# Patient Record
Sex: FEMALE | Race: WHITE | Employment: OTHER | URBAN - METROPOLITAN AREA
[De-identification: names, ages, dates, MRNs, and addresses within clinical notes are randomized per-mention and may not be internally consistent; named-entity substitution may affect disease eponyms.]

---

## 2018-04-03 ENCOUNTER — HOSPITAL ENCOUNTER (OUTPATIENT)
Dept: WOMENS IMAGING | Age: 63
Discharge: HOME | End: 2018-04-03
Attending: FAMILY MEDICINE | Admitting: FAMILY MEDICINE

## 2018-04-03 DIAGNOSIS — Z12.31 VISIT FOR SCREENING MAMMOGRAM: ICD-10-CM

## 2018-08-27 ENCOUNTER — OFFICE VISIT (OUTPATIENT)
Dept: ORTHOPEDIC SURGERY | Age: 63
End: 2018-08-27

## 2018-08-27 VITALS
RESPIRATION RATE: 16 BRPM | DIASTOLIC BLOOD PRESSURE: 73 MMHG | WEIGHT: 270 LBS | HEIGHT: 64 IN | BODY MASS INDEX: 46.1 KG/M2 | SYSTOLIC BLOOD PRESSURE: 138 MMHG

## 2018-08-27 DIAGNOSIS — M65.30 TRIGGER FINGER, ACQUIRED: Primary | ICD-10-CM

## 2018-08-27 PROCEDURE — 99213 OFFICE O/P EST LOW 20 MIN: CPT | Performed by: ORTHOPAEDIC SURGERY

## 2018-08-27 PROCEDURE — 20550 NJX 1 TENDON SHEATH/LIGAMENT: CPT | Performed by: ORTHOPAEDIC SURGERY

## 2018-08-27 RX ORDER — VERAPAMIL HYDROCHLORIDE 240 MG/1
240 TABLET, FILM COATED, EXTENDED RELEASE ORAL
COMMUNITY
Start: 2018-05-29 | End: 2022-02-01 | Stop reason: ALTCHOICE

## 2018-08-27 RX ORDER — CITALOPRAM 40 MG/1
40 TABLET ORAL
COMMUNITY
Start: 2018-07-30 | End: 2018-08-27

## 2018-08-27 RX ORDER — CARVEDILOL 12.5 MG/1
TABLET ORAL 2 TIMES DAILY WITH MEALS
COMMUNITY
Start: 2018-08-20

## 2018-08-27 RX ORDER — OXYBUTYNIN CHLORIDE 10 MG/1
10 TABLET, EXTENDED RELEASE ORAL
COMMUNITY
End: 2019-04-30

## 2018-08-27 RX ORDER — LISINOPRIL 40 MG/1
20 TABLET ORAL DAILY
COMMUNITY
Start: 2018-05-29 | End: 2022-02-01 | Stop reason: ALTCHOICE

## 2018-08-27 NOTE — PROGRESS NOTES
I last examined this patient slightly less than 3 years ago at which time she was preop surgery of right ring trigger finger. She obtained prompt and complete relief of all symptoms. Unfortunately, the condition has recurred in her right ring finger, which has had 2 previous injections, and the patient returns to the office requesting additional treatment. She complains of pain, swelling, catching and stiffness of the digit for the past 1 week. Symptoms have become worse recently. The patient's social history, past medical history, family history, medications, allergies and review of systems have been reviewed, and dated and are recorded in the chart. On examination there is mild soft tissue swelling of the digit. There is no deformity. There is tenderness, thickening and nodularity at the base of the flexor tendon sheath. Range of motion is slightly limited in flexion and extension. The digit sticks in flexion and pops into extension, accompanied by some pain. Skin is intact without lesions. Distal circulation and sensation are intact. Muscle strength and coordination are normal.  Reflexes and present bilaterally. Joints are stable. There are no subcutaneous nodules or enlarged epitrochlear lymph nodes. Impression: Recurrent right ring finger trigger digit. The nature of this medical problem is fully discussed with the patient, including all treatment options. All questions are answered. The right  hand is prepared with Betadine and alcohol and the flexor tendon sheath of the right ring finger is injected with 1/2 milliliter of 1% lidocaine and 20 mg.of triamcinalone, with good filling. The patient is advised regarding the expected response and possible reactions from the injection. The patient is asked to call me if full, painless function has not returned within 4 weeks. The possibility of recurrence of the problem is discussed.  The patient understands that this is the 3rd and last steroid injection for this digit. If the problem recurs in this digit, they are asked to call me to discus surgical correction.

## 2019-04-01 ENCOUNTER — OFFICE VISIT (OUTPATIENT)
Dept: ORTHOPEDIC SURGERY | Age: 64
End: 2019-04-01
Payer: COMMERCIAL

## 2019-04-01 VITALS
SYSTOLIC BLOOD PRESSURE: 114 MMHG | BODY MASS INDEX: 46.1 KG/M2 | RESPIRATION RATE: 16 BRPM | HEIGHT: 64 IN | WEIGHT: 270 LBS | HEART RATE: 64 BPM | DIASTOLIC BLOOD PRESSURE: 67 MMHG

## 2019-04-01 DIAGNOSIS — M65.30 TRIGGER FINGER, ACQUIRED: Primary | ICD-10-CM

## 2019-04-01 PROCEDURE — 99243 OFF/OP CNSLTJ NEW/EST LOW 30: CPT | Performed by: ORTHOPAEDIC SURGERY

## 2019-04-01 RX ORDER — NAPROXEN 500 MG/1
500 TABLET ORAL
COMMUNITY
Start: 2019-03-21 | End: 2022-02-01 | Stop reason: ALTCHOICE

## 2019-04-01 NOTE — LETTER
[facility]  Surgery Scheduling Form:  DEMOGRAPHICS:                                                                                                              .    Patient Name:  Nikolay Dillon  Patient :  1955   Patient SS#:  [id number]    Patient Phone:  [phone] (home) [phone] (work)     Patient Address:  [place] [id number]    PCP:  Kerby Crigler, MD  Insurance:   Payor/Plan Subscr  Sex Relation Sub. Ins. ID Effective Group Num   1. [address] A 1955 Female  [id number]                                    [id number]   DIAGNOSIS & PROCEDURE:                                                                                            .  Diagnosis:   left Ring Finger Trigger Finger (727.03)   M65.30  Operation:  left Ring Finger Trigger Finger Release  [CPT: 61528]  Location:  [facility]  Surgeon:  Cecilio Feng    SCHEDULING INFORMATION:                                                                                         .  Surgeon's Scheduling Instruction:  elective  Requested Date:    OR Time:  8:30 Patient Arrival Time:  7:00OR Time Required:  10  Minutes  Anesthesia:  MAC/TIVA  Equipment:  None  Mini C-Arm:  No   Standard C-Arm:  No  Status:  outpatient  PAT Required:  Yes  Comments:         ALLERGIES:   [address] [facility] SWAPNIL Becerra MD  19     BILLING INFORMATION:                                                                                                    .    Procedure:       CPT Code Modifier

## 2019-04-01 NOTE — Clinical Note
Dear  Deng Acevedo MD,Thank you very much for your referral or Ms. Anshu Portillo to me for evaluation and treatment of her Hand & Wrist condition. I appreciate your confidence in me and thank you for allowing me the opportunity to care for your patients. If I can be of any further assistance to you on this or any other patient, please do not hesitate to contact me. Sincerely,Nitish Rosado MD

## 2019-04-01 NOTE — LETTER
CONSENT TO OPERATION  AND/OR OTHER PROCEDURE(S)          PATIENT : Samy Poole   YOB: 1955    DATE : 4/1/19          1. I request and consent that Dr. Nicole Duran,  and/or his associates or assistants perform an operation and/or procedures on the above patient at  [place] [address], to treat the condition(s) which appear indicated by the diagnostic studies already performed. I have been told that in general terms the nature, purpose and reasonable expectations of the operation and/or procedure(s) are:       left Ring Finger trigger finger release       2. It has been explained to me by the informing physician that during the course of the operation and/or procedure(s) unforeseen conditions may be revealed that necessitate an extension of the original operation and/or procedure(s) or different operation and/or procedures than those set forth in Paragraph 1. I therefore authorize and request that my physician and/or his associates or assistants perform such operations and/or procedures as are necessary and desirable in the exercise of professional judgment. The authority granted under this Paragraph 2 shall extend to all conditions that require treatment and are known to my physician at the time the operation is commenced. 3. I have been made aware by the informing physician of certain risks and consequences that are associated with the operation and/or procedure(s) described in Paragraph 1, the reasonable alternative methods or treatment, the possible consequences, the possibility that the operation and/or procedure(s) may be unsuccessful and the possibility of complications. I understand the reasonably known risks to be:      ? Bleeding  ? Infection  ? Poor Healing  ? Possible Damage to Nerve, Vessel, Tendon/Muscle or Bone  ? Need for further Treatment/Surgery  ? Stiffness  ? Pain  ? Residual or Recurrent Symptoms  ? Anesthetic and/or Medical Risks  ? 4. I have also been informed by the informing physician that there are other risks from both known and unknown causes that are attendant to the performance of any surgical procedure. I am aware that the practice of medicine and surgery is not an exact science, and that no guarantees have been made to me concerning the results of the operation and/or procedure(s). 5. I   CONSENT / REFUSE CONSENT  (strike the phrase that does not apply) to the taking of photographs before, during and/or after the operation or procedure for scientific/educational purposes. 6. I consent to the administration of anesthesia and to the use of such anesthetics as may be deemed advisable by the anesthesiologist who has been engaged by me or my physician. 7. I certify that I have read and understand the above consent to operation and/or other procedure(s); that the explanations therein referred to were made to me by the informing physician in advance of my signing this consent; that all blanks or statements requiring insertion or completion were filled in and inapplicable paragraphs, if any, were stricken before I signed; and that all questions asked by me about the operation and/or procedure(s) which I have consented to have been fully answered in a satisfactory manner.             _______________________  Witness        Signature Of Patient          Kunal Caruso. Mary Pickering      _______________________  Informing Physician         Signature of Informing Physician           If patient is unable to sign or is a minor, complete one of the following:    (A)  Patient is a minor  years of age. (B)  Patient is unable to sign because: The undersigned represents that he or she is duly authorized to execute this consent for and on behalf of the above named patient.               Witness               o  Parent  o  Guardian   o  Spouse       o  Other (specify)

## 2019-04-01 NOTE — PROGRESS NOTES
Ms. Mariana Espinoza returns today in follow-up of her previously treated  left  Ring Finger Trigger Finger. She was last seen in August, 2018 by  Dr. Zofia Schneider  at which time she was treated with Steroid Injection. She experienced complete relief of her initial symptoms. She  has noticed symptom recurrence over the last several months. She returns today with Worsened symptoms of left Ring Finger Stenosing Tenosynovitis, requesting further treatment. The patient's , past medical history, medications, allergies,  family history, social history, and review of systems have been reviewed and are recorded in the chart. Physical Exam:  Vitals  BP: 114/67  Pulse: 64  Resp: 16  Height: 5' 4\" (162.6 cm)  Weight: 270 lb (122.5 kg)  Ms. Mariana Espinoza appears well, she is in no apparent distress, she demonstrates appropriate mood & affect. Skin: Skin color, texture, turgor normal. No rashes or lesions bilaterally  Digital range of motion is full bilaterally. There is mild triggering at the A1 pulley of the symptomatic left Ring Finger. Wrist range of motion is full and equal bilateral otherwise normal bilaterally  There is no evidence of gross joint instability bilaterally. Sensation is subjectively normal bilaterally  Vascular examination reveals normal and good capillary refill bilaterally  Swelling is mild in the symptomatic digit(s) on the Left, normal on the Right  Muscular strength is clinically appropriate bilaterally. Examination for Stenosing Tenosynovitis demonstrates moderate tenderness, thickening & nodularity at the A-1 pulley(s) of the Left Ring Finger. There is a palpable Nota's Node. There is triggering on active flexion with pain. No other digits demonstrate evidence of Stenosing Tenosynovitis. Examination of the first dorsal extensor compartments of the wrist demonstrates no thickening or fullness. There is no tenderness to palpation over the extensor tendons.   Pain is not elicited with resisted thumb extension, and Finklestein's maneuver is negative bilaterally. Impression:  Ms. Clara Santiago is showing evidence of recurrent Stenosing Tenosynovitis (Trigger Finger) after previous treatment. She requests additional treatment at this time. Plan:    After discussion of the treatment options available for the stenosing tenosynovitis and review of her past treatments, I have outline for Ms. Ernesto Poole the surgical treatment options for trigger finger. We have discussed the details of the surgical procedure and the pre, alexander and postoperative concerns and appropriate expectations after this surgical procedure. She was given opportunity to ask questions and had them answered fully to her satisfaction. She voiced an understanding of the procedure and did wish to proceed with left Ring Finger Flexor Tendon Sheath A1 Pulley Release (Trigger Finger Release). I had an extensive discussion with Ms. Clara Santiago and any family members present regarding the natural history, etiology, and long term consequences of this problem. I have outlined a treatment plan with them and, in my opinion, surgical intervention is indicated at this time. I have discussed with them the potential complications, limitations, expectations, alternatives, and risks of left Ring Finger Flexor Tendon Sheath A1 Pulley Release (Trigger Finger Release). She has had full opportunity to ask her questions. I have answered them all to her satisfaction. I feel that Ms. Clara Santiago  and any present family members do understand our discussion today and they have provided informed consent for left Ring Finger Flexor Tendon Sheath A1 Pulley Release (Trigger Finger Release). I have also discussed with Ms. Clara Santiago the other treatment options available to her for this condition. We have today selected to proceed with Surgical treatment.   She has voiced and  understanding that there are other less aggressive treatment options which are available in this situation, albeit possibly less efficacious or durable, and she is comfortable with the plan that she has chosen. Ms. Karlos Arce has been given a full verbal list of instructions and precautions related to her present condition. I have asked her to followup with me in the office at the prescribed time. She is also specifically requested to call or return to the office sooner if her symptoms change or worsen prior to the next scheduled appointment.

## 2019-04-26 ENCOUNTER — ANESTHESIA EVENT (OUTPATIENT)
Dept: OPERATING ROOM | Age: 64
End: 2019-04-26
Payer: COMMERCIAL

## 2019-04-30 RX ORDER — ALBUTEROL SULFATE 90 UG/1
1 AEROSOL, METERED RESPIRATORY (INHALATION) EVERY 6 HOURS PRN
COMMUNITY
Start: 2017-11-17 | End: 2022-02-01 | Stop reason: ALTCHOICE

## 2019-04-30 RX ORDER — ALBUTEROL SULFATE 2.5 MG/3ML
2.5 SOLUTION RESPIRATORY (INHALATION) PRN
COMMUNITY
Start: 2018-09-18 | End: 2022-02-01 | Stop reason: ALTCHOICE

## 2019-04-30 RX ORDER — NICOTINE POLACRILEX 4 MG/1
20 GUM, CHEWING ORAL DAILY
COMMUNITY
Start: 2015-05-18

## 2019-04-30 RX ORDER — ACETAMINOPHEN 500 MG
500 TABLET ORAL EVERY 6 HOURS PRN
COMMUNITY
End: 2022-02-01 | Stop reason: ALTCHOICE

## 2019-04-30 NOTE — PROGRESS NOTES
C-Difficile admission screening and protocol:     * Admitted with diarrhea?no     *Prior history of C-Diff. In last 3 months?no     *Antibiotic use in the past 6-8 weeks?no     *Prior hospitalization or nursing home in the last month?   no       [address] time___0700 per pt_________        Surgery time____0820________    Take the following medications with a sip of water:lisinopril,coreg and verapamil    Do not eat or drink anything after 12:00 midnight prior to your surgery. This includes water chewing gum, mints and ice chips. You may brush your teeth and gargle the morning of your surgery, but do not swallow the water     Please see your family doctor/pediatrician for a history and physical and/or concerning medications. Bring any test results/reports from your physicians office. If you are under the care of a heart doctor or specialist doctor, please be aware that you may be asked to them for clearance    You may be asked to stop blood thinners such as Coumadin, Plavix, Fragmin, Lovenox, etc., or any anti-inflammatories such as:  Aspirin, Ibuprofen, Advil, Naproxen prior to your surgery. We also ask that you stop any OTC medications such as fish oil, vitamin E, glucosamine, garlic, Multivitamins, COQ 10, etc.    We ask that you do not smoke 24 hours prior to surgery  We ask that you do not  drink any alcoholic beverages 24 hours prior to surgery     You must make arrangements for a responsible adult to take you home after your surgery. For your safety you will not be allowed to leave alone or drive yourself home. Your surgery will be cancelled if you do not have a ride home. Also for your safety, it is strongly suggested that someone stay with you the first 24 hours after your surgery. A parent or legal guardian must accompany a child scheduled for surgery and plan to stay at the hospital until the child is discharged.     Please do not bring other children with you. For your comfort, please wear simple loose fitting clothing to the hospital.  Please do not bring valuables. Do not wear any make-up or nail polish on your fingers or toes      For your safety, please do not wear any jewelry or body piercing's on the day of surgery. All jewelry must be removed. If you have dentures, they will be removed before going to operating room. For your convenience, we will provide you with a container. If you wear contact lenses or glasses, they will be removed, please bring a case for them. If you have a living will and a durable power of  for healthcare, please bring in a copy. As part of our patient safety program to minimize surgical site infections, we ask you to do the following:    · Please notify your surgeon if you develop any illness between         now and the  day of your surgery. · This includes a cough, cold, fever, sore throat, nausea,         or vomiting, and diarrhea, etc.  ·  Please notify your surgeon if you experience dizziness, shortness         of breath or blurred vision between now and the time of your surgery. Do not shave your operative site 96 hours prior to surgery. For face and neck surgery, men may use an electric razor 48 hours   prior to surgery. You may shower the night before surgery or the morning of   your surgery with an antibacterial soap. You will need to bring a photo ID and insurance card    [facility] has an onsite pharmacy, would you like to utilize our pharmacy     If you will be staying overnight and use a C-pap machine, please bring   your C-pap to hospital     Our goal is to provide you with excellent care, therefore, visitors will be limited to two(2) in the room at a time so that we may focus on providing this care for you. Please contact pre-admission testing if you have any further questions.                  [facility] phone number:  [phone] [employer]

## 2019-05-02 ENCOUNTER — HOSPITAL ENCOUNTER (OUTPATIENT)
Age: 64
Setting detail: OUTPATIENT SURGERY
Discharge: HOME | End: 2019-05-02
Attending: ORTHOPAEDIC SURGERY | Admitting: ORTHOPAEDIC SURGERY
Payer: COMMERCIAL

## 2019-05-02 ENCOUNTER — ANESTHESIA (OUTPATIENT)
Dept: OPERATING ROOM | Age: 64
End: 2019-05-02
Payer: COMMERCIAL

## 2019-05-02 VITALS
DIASTOLIC BLOOD PRESSURE: 60 MMHG | BODY MASS INDEX: 46.21 KG/M2 | OXYGEN SATURATION: 98 % | HEART RATE: 53 BPM | HEIGHT: 65 IN | TEMPERATURE: 97 F | RESPIRATION RATE: 18 BRPM | WEIGHT: 277.34 LBS | SYSTOLIC BLOOD PRESSURE: 108 MMHG

## 2019-05-02 VITALS — OXYGEN SATURATION: 100 % | SYSTOLIC BLOOD PRESSURE: 109 MMHG | DIASTOLIC BLOOD PRESSURE: 54 MMHG

## 2019-05-02 PROCEDURE — 3700000000 HC ANESTHESIA ATTENDED CARE: Performed by: ORTHOPAEDIC SURGERY

## 2019-05-02 PROCEDURE — 7100000011 HC PHASE II RECOVERY - ADDTL 15 MIN: Performed by: ORTHOPAEDIC SURGERY

## 2019-05-02 PROCEDURE — 2500000003 HC RX 250 WO HCPCS: Performed by: ORTHOPAEDIC SURGERY

## 2019-05-02 PROCEDURE — 7100000000 HC PACU RECOVERY - FIRST 15 MIN: Performed by: ORTHOPAEDIC SURGERY

## 2019-05-02 PROCEDURE — 6360000002 HC RX W HCPCS: Performed by: NURSE ANESTHETIST, CERTIFIED REGISTERED

## 2019-05-02 PROCEDURE — 2580000003 HC RX 258: Performed by: ANESTHESIOLOGY

## 2019-05-02 PROCEDURE — 3600000005 HC SURGERY LEVEL 5 BASE: Performed by: ORTHOPAEDIC SURGERY

## 2019-05-02 PROCEDURE — 2500000003 HC RX 250 WO HCPCS: Performed by: NURSE ANESTHETIST, CERTIFIED REGISTERED

## 2019-05-02 PROCEDURE — 2709999900 HC NON-CHARGEABLE SUPPLY: Performed by: ORTHOPAEDIC SURGERY

## 2019-05-02 PROCEDURE — 7100000010 HC PHASE II RECOVERY - FIRST 15 MIN: Performed by: ORTHOPAEDIC SURGERY

## 2019-05-02 PROCEDURE — 7100000001 HC PACU RECOVERY - ADDTL 15 MIN: Performed by: ORTHOPAEDIC SURGERY

## 2019-05-02 RX ORDER — SODIUM CHLORIDE 0.9 % (FLUSH) 0.9 %
10 SYRINGE (ML) INJECTION PRN
Status: DISCONTINUED | OUTPATIENT
Start: 2019-05-02 | End: 2019-05-02 | Stop reason: HOSPADM

## 2019-05-02 RX ORDER — SODIUM CHLORIDE 9 MG/ML
INJECTION, SOLUTION INTRAVENOUS CONTINUOUS
Status: DISCONTINUED | OUTPATIENT
Start: 2019-05-02 | End: 2019-05-02 | Stop reason: HOSPADM

## 2019-05-02 RX ORDER — FENTANYL CITRATE 50 UG/ML
25 INJECTION, SOLUTION INTRAMUSCULAR; INTRAVENOUS EVERY 5 MIN PRN
Status: DISCONTINUED | OUTPATIENT
Start: 2019-05-02 | End: 2019-05-02 | Stop reason: HOSPADM

## 2019-05-02 RX ORDER — OXYCODONE HYDROCHLORIDE AND ACETAMINOPHEN 5; 325 MG/1; MG/1
1 TABLET ORAL PRN
Status: DISCONTINUED | OUTPATIENT
Start: 2019-05-02 | End: 2019-05-02 | Stop reason: HOSPADM

## 2019-05-02 RX ORDER — ONDANSETRON 2 MG/ML
4 INJECTION INTRAMUSCULAR; INTRAVENOUS
Status: DISCONTINUED | OUTPATIENT
Start: 2019-05-02 | End: 2019-05-02 | Stop reason: HOSPADM

## 2019-05-02 RX ORDER — PROPOFOL 10 MG/ML
INJECTION, EMULSION INTRAVENOUS CONTINUOUS PRN
Status: DISCONTINUED | OUTPATIENT
Start: 2019-05-02 | End: 2019-05-02 | Stop reason: SDUPTHER

## 2019-05-02 RX ORDER — OXYCODONE HYDROCHLORIDE AND ACETAMINOPHEN 5; 325 MG/1; MG/1
2 TABLET ORAL PRN
Status: DISCONTINUED | OUTPATIENT
Start: 2019-05-02 | End: 2019-05-02 | Stop reason: HOSPADM

## 2019-05-02 RX ORDER — PROPOFOL 10 MG/ML
INJECTION, EMULSION INTRAVENOUS PRN
Status: DISCONTINUED | OUTPATIENT
Start: 2019-05-02 | End: 2019-05-02 | Stop reason: SDUPTHER

## 2019-05-02 RX ORDER — LIDOCAINE HYDROCHLORIDE 20 MG/ML
INJECTION, SOLUTION EPIDURAL; INFILTRATION; INTRACAUDAL; PERINEURAL PRN
Status: DISCONTINUED | OUTPATIENT
Start: 2019-05-02 | End: 2019-05-02 | Stop reason: SDUPTHER

## 2019-05-02 RX ORDER — SODIUM CHLORIDE 0.9 % (FLUSH) 0.9 %
10 SYRINGE (ML) INJECTION EVERY 12 HOURS SCHEDULED
Status: DISCONTINUED | OUTPATIENT
Start: 2019-05-02 | End: 2019-05-02 | Stop reason: HOSPADM

## 2019-05-02 RX ADMIN — PROPOFOL 160 MCG/KG/MIN: 10 INJECTION, EMULSION INTRAVENOUS at 08:11

## 2019-05-02 RX ADMIN — SODIUM CHLORIDE: 9 INJECTION, SOLUTION INTRAVENOUS at 07:38

## 2019-05-02 RX ADMIN — LIDOCAINE HYDROCHLORIDE 50 MG: 20 INJECTION, SOLUTION EPIDURAL; INFILTRATION; INTRACAUDAL; PERINEURAL at 08:11

## 2019-05-02 RX ADMIN — PROPOFOL 100 MG: 10 INJECTION, EMULSION INTRAVENOUS at 08:11

## 2019-05-02 ASSESSMENT — PULMONARY FUNCTION TESTS
PIF_VALUE: 0

## 2019-05-02 ASSESSMENT — PAIN SCALES - GENERAL
PAINLEVEL_OUTOF10: 0

## 2019-05-02 ASSESSMENT — PAIN DESCRIPTION - DESCRIPTORS: DESCRIPTORS: THROBBING

## 2019-05-02 ASSESSMENT — ENCOUNTER SYMPTOMS: SHORTNESS OF BREATH: 0

## 2019-05-02 ASSESSMENT — PAIN - FUNCTIONAL ASSESSMENT
PAIN_FUNCTIONAL_ASSESSMENT: 0-10
PAIN_FUNCTIONAL_ASSESSMENT: ACTIVITIES ARE NOT PREVENTED

## 2019-05-02 NOTE — ANESTHESIA POSTPROCEDURE EVALUATION
Department of Anesthesiology  Postprocedure Note    Patient: Lincoln Montana  MRN: [id number]  YOB: 1955  Date of evaluation: 5/2/2019  Time:  9:55 AM     Procedure Summary     Date:  05/02/19 Room / Location:  [facility] OR 03 / [facility] OR    Anesthesia Start:  0810 Anesthesia Stop:  0825    Procedure:  LEFT RING FINGER TRIGGER FINGER RELEASE (Left ) Diagnosis:  (LEFT RING FINGER TRIGGER FINGER)    Surgeon:  Jose Elias Stroud MD Responsible Provider:  Nubia Gregorio MD    Anesthesia Type:  TIVA ASA Status:  3          Anesthesia Type: TIVA    Jamie Phase I: Jamie Score: 10    Jamie Phase II: Jamie Score: 10    Last vitals: Reviewed and per EMR flowsheets.        Anesthesia Post Evaluation    Patient location during evaluation: PACU  Patient participation: complete - patient participated  Level of consciousness: awake and alert  Airway patency: patent  Nausea & Vomiting: no nausea and no vomiting  Complications: no  Cardiovascular status: hemodynamically stable  Respiratory status: acceptable  Hydration status: stable

## 2019-05-02 NOTE — PROGRESS NOTES
0900 pt alert and oriented, vital signs stable, no pain in left hand, pt able to move all of her fingers well. Taking PO snacks with no issues.

## 2019-05-02 NOTE — H&P
Pre-operative Update of H&P:    I  have seen & examined Ms. Saran Poole related solely to her hand and upper extremity conditions, prior to the scheduled procedure on the date of her surgery. The indications for the planned surgical procedure & and her upper-extremity conditionare unchanged. Please see the Anesthesia Pre-Op Note from date of surgery for Ms. Nate Poole's systemic evaluation.

## 2019-05-02 NOTE — PROGRESS NOTES
Patient arouses to name. Resp easy unlabored on room air O2 with SAO2 97%. Patient denies C/O pain or nausea. Moving all extremities to command. HOB up. VSS.

## 2019-05-02 NOTE — OP NOTE
OPERATIVE REPORT              . Patient:  Cristiane Yeager    YOB: 1955  Date of Service:  5/2/2019  Location:  [facility]        Preoperative Diagnosis:  Left Ring Finger trigger finger. Postoperative Diagnosis: Same. Procedure: Left Ring Finger trigger finger release (A1 pulley release). Surgeon:    Leigh Ann Bose. Candice Gonzalez MD    Surgical Assistant:    SHYAM Pereira Assistant    Anesthesia:  Local with sedation. Blood Loss:  Minimal.     Complications:  None. Tourniquet Time:  2 minutes. Indications:  Ms. Cristiane Yeager  is a 61y.o.  year old female with a Left Ring Finger trigger finger. I have discussed preoperatively with her the complications, limitations, expectations, alternatives and risks of the planned surgical care. She has voiced an understanding of our discussion. All of her questions have been fully answered to her satisfaction, and she has provided written informed consent to proceed. Procedure:  After written consent was obtained and the proper operative site was identified and marked, Ms. Cristiane Yeager  was brought to the operating room, placed in the supine position on the operating room table with the Left arm extended upon a hand table. Under an appropriate level of sedation, local anesthetic (1% Lidocaine and 1/2% Marcaine both without Epinephrine) was instilled in the planned surgical field. Her Left upper extremity was prepped and draped in the usual sterile fashion. After Eshmarch exsanguination the pneumo-tourniquet was inflated to 250 milimeters of mercury. A 1 centimeter oblique incision was fashioned over the base of the flexor tendon sheath of the Left Ring Finger. Dissection was carried carefully through the subcutaneous tissues, taking great care to identify and protect the neurovascular structures. The flexor tendon sheath was carefully identified and cleared of surrounding soft tissue.  The A1 pulley was

## 2019-05-02 NOTE — PROGRESS NOTES
Pt's vital signs stable in chair, discharge instructions given with friend at bedside. No further questions at this time.

## 2019-05-02 NOTE — PROGRESS NOTES
Patient admitted to PACU from OR. Patient asleep. Resp easy unlabored on 2L NC with SAO2 99%. Monitor in SB. Left hand dressing dry and intact, no drainage noted. Left hand/arm elevated on pillow. VSS. IV patent to right hand.

## 2019-05-02 NOTE — ANESTHESIA PRE PROCEDURE
Body mass index is 46.15 kg/m². CBC:   Lab Results   Component Value Date    WBC 7.7 11/12/2012    RBC 4.56 11/12/2012    HGB 13.9 11/12/2012    HCT 40.3 11/12/2012    MCV 88.4 11/12/2012    RDW 13.8 11/12/2012     11/12/2012       CMP:   Lab Results   Component Value Date     10/29/2015    K 5.1 10/29/2015     10/29/2015    CO2 24 10/29/2015    BUN 12 10/29/2015    CREATININE 0.5 10/29/2015    GFRAA >60 10/29/2015    GFRAA >60 11/12/2012    LABGLOM >60 10/29/2015    GLUCOSE 94 10/29/2015    CALCIUM 9.1 10/29/2015       POC Tests: No results for input(s): POCGLU, POCNA, POCK, POCCL, POCBUN, POCHEMO, POCHCT in the last 72 hours.     Coags:   Lab Results   Component Value Date    PROTIME 10.7 11/12/2012    INR 0.94 11/12/2012    APTT 29.5 11/12/2012       HCG (If Applicable): No results found for: PREGTESTUR, PREGSERUM, HCG, HCGQUANT     ABGs: No results found for: PHART, PO2ART, SHC1YDM, WHW8VRN, BEART, K3OJJHUJ     Type & Screen (If Applicable):  No results found for: [facility]    Anesthesia Evaluation  Patient summary reviewed no history of anesthetic complications:   Airway: Mallampati: II  TM distance: >3 FB   Neck ROM: full  Mouth opening: > = 3 FB Dental:      Comment: No loose teeth    Pulmonary: breath sounds clear to auscultation      (-) COPD, asthma, shortness of breath, recent URI and sleep apnea                           Cardiovascular:    (+) hypertension:,     (-) valvular problems/murmurs, past MI, CAD, CABG/stent, dysrhythmias,  angina and murmur      Rhythm: regular  Rate: normal           Beta Blocker:  Dose within 24 Hrs         Neuro/Psych:   (+) psychiatric history:depression/anxiety    (-) seizures, neuromuscular disease, TIA and CVA           GI/Hepatic/Renal:   (+) GERD: well controlled, morbid obesity     (-) PUD, hepatitis, liver disease and no renal disease       Endo/Other:        (-) diabetes mellitus, hypothyroidism, hyperthyroidism, blood dyscrasia               Abdominal:           Vascular:                                        Anesthesia Plan      TIVA     ASA 3       Induction: intravenous. Anesthetic plan and risks discussed with patient. Plan discussed with CRNA. This pre-anesthesia assessment may be used as a history and physical.    DOS STAFF ADDENDUM:    Pt seen and examined, chart reviewed (including anesthesia, drug and allergy history). No interval changes to history and physical examination. Anesthetic plan, risks, benefits, alternatives, and personnel involved discussed with patient. Patient verbalized an understanding and agrees to proceed.       Gallito James MD  May 2, 2019  7:34 AM        Gallito James MD   5/2/2019

## 2019-05-02 NOTE — PROGRESS NOTES
Patient awake and alert. Resp easy unlabored on room airO2. . Patient denies C/O pain or nausea. Neurovascular checks stable to bilat upper extremities. VSS. IV patent. Patient stable to transfer to ACU for phase.

## 2019-05-13 ENCOUNTER — OFFICE VISIT (OUTPATIENT)
Dept: ORTHOPEDIC SURGERY | Age: 64
End: 2019-05-13

## 2019-05-13 VITALS — RESPIRATION RATE: 16 BRPM | HEIGHT: 65 IN | BODY MASS INDEX: 46.15 KG/M2 | WEIGHT: 277 LBS

## 2019-05-13 DIAGNOSIS — M65.30 TRIGGER FINGER, ACQUIRED: Primary | ICD-10-CM

## 2019-05-13 PROCEDURE — 99024 POSTOP FOLLOW-UP VISIT: CPT | Performed by: ORTHOPAEDIC SURGERY

## 2019-05-13 NOTE — Clinical Note
Dear  Krysten Donovan MD,Thank you very much for your referral or Ms. Lionel Coto to me for evaluation and treatment of her Hand & Wrist condition. I appreciate your confidence in me and thank you for allowing me the opportunity to care for your patients. If I can be of any further assistance to you on this or any other patient, please do not hesitate to contact me. Sincerely,Nitish Bustos MD

## 2019-05-13 NOTE — PATIENT INSTRUCTIONS
Post-Operative Instructions    Trigger Finger Release:    ? Keep hand elevated with fingers above eye-level to control swelling. ? Keep hand and bandage clean and dry. ? Do not change or unwrap bandage. Please leave bandage in place until your follow-up appointment. ? Work hard on finger motion starting immediately. ? Several times each hour, fully straighten and fully bend fingers and thumb completely. You may use your other hand to help as needed. This may cause some discomfort, but will not damage your surgery. ? You may use your operated hand for lightweight tasks (e.g. writing, eating, dressing, etc.). NO LIFTING, CARRYING OR HEAVY USE.  ? You may take the prescribed pain medication as needed    OR   ? You may take over the counter medication (Tylenol, Advil, Aleve, etc.) but you should not take both together unless otherwise instructed. ? Please call the office at [phone]  in 24 - 48 hours to schedule a follow up appointment for approximately 7 - 10 days after surgery. ? Please call the office at [phone]  if you have any questions or problems. Amber Rosado MD

## 2019-05-13 NOTE — PROGRESS NOTES
Ms. Evaristo Amaya returns today in follow-up of her recent left Ring Finger A1 Pulley (Trigger Finger) Release done approximately 1 week ago. She has done well noting mild discomfort and no other reported complications. She notes pre-operative symptoms to be Fully Resolved at this time. Physical Exam:  Skin incision is healing well, no significant drainage, no significant erythema. Digital range of motion is full and equal bilateral in the Ring Finger, normal in all other digits. Wrist range of motion is full. Sensation is normal in the Ring Finger. Vascular examination reveals normal and good capillary refill. Swelling is mild. Her preoperative triggering is Fully Resolved. Impression:  Ms. Evaristo Amaya is doing well after recent left Ring Finger Trigger Finger Release. Plan:  Ms. Evaristo Amaya is instructed in work on Active & Passive range of motion of the digits, wrist, & elbow. These modalities were specifically demonstrated to her today. We discussed the appropriateness of gradual resumption of use of the operated hand and the return to normal use as comfort allows. She is given instructions regarding management of the fresh surgical incision and progressive use of desensitization and tissue massage techniques. We discussed the appropriate expectations and timeline for symptom improvement. She is provided a written patient instruction sheet titled: Postoperative Instructions After Trigger Finger Release. I have asked Ms. Lona Poole to follow-up with me or contact me by telephone over the next 2-4 weeks if her symptoms have not fully resolved or if she has not regained full & painless return of function. She is also specifically instructed to return to the office or call for an appointment sooner if her symptoms are changing or worsening prior to that time.

## 2019-07-01 ENCOUNTER — HOSPITAL ENCOUNTER (OUTPATIENT)
Dept: WOMENS IMAGING | Age: 64
Discharge: HOME | End: 2019-07-01
Payer: COMMERCIAL

## 2019-07-01 DIAGNOSIS — Z12.31 VISIT FOR SCREENING MAMMOGRAM: ICD-10-CM

## 2019-07-01 PROCEDURE — 77063 BREAST TOMOSYNTHESIS BI: CPT

## 2020-08-10 ENCOUNTER — HOSPITAL ENCOUNTER (OUTPATIENT)
Dept: WOMENS IMAGING | Age: 65
Discharge: HOME | End: 2020-08-10
Payer: COMMERCIAL

## 2020-08-10 PROCEDURE — 77063 BREAST TOMOSYNTHESIS BI: CPT

## 2021-09-02 ENCOUNTER — HOSPITAL ENCOUNTER (OUTPATIENT)
Dept: WOMENS IMAGING | Age: 66
Discharge: HOME | End: 2021-09-02
Payer: MEDICARE

## 2021-09-02 DIAGNOSIS — Z12.31 VISIT FOR SCREENING MAMMOGRAM: ICD-10-CM

## 2021-09-02 PROCEDURE — 77063 BREAST TOMOSYNTHESIS BI: CPT

## 2022-01-12 ENCOUNTER — HOSPITAL ENCOUNTER (OUTPATIENT)
Dept: WOMENS IMAGING | Age: 67
Discharge: HOME | End: 2022-01-12
Payer: MEDICARE

## 2022-01-12 DIAGNOSIS — Z78.0 MENOPAUSE: ICD-10-CM

## 2022-01-12 PROCEDURE — 77080 DXA BONE DENSITY AXIAL: CPT

## 2022-02-01 RX ORDER — IBUPROFEN 600 MG/1
600 TABLET ORAL NIGHTLY
COMMUNITY

## 2022-02-01 RX ORDER — OXYBUTYNIN CHLORIDE 5 MG/1
5 TABLET ORAL NIGHTLY
COMMUNITY

## 2022-02-01 RX ORDER — ROSUVASTATIN CALCIUM 5 MG/1
5 TABLET, COATED ORAL NIGHTLY
COMMUNITY

## 2022-02-01 RX ORDER — LANOLIN ALCOHOL/MO/W.PET/CERES
1000 CREAM (GRAM) TOPICAL EVERY EVENING
COMMUNITY

## 2022-02-01 NOTE — PROGRESS NOTES
Preoperative Screening for Elective Surgery/Invasive Procedures While COVID-19 present in the community     1. Have you tested positive or have been told to self-isolate for COVID-19 like symptoms within the past 28 days?no  2. Do you currently have any of the following symptoms?no  ? Fever >100.0 F or 99.9 F in immunocompromised patients? NO  ? New onset cough, shortness of breath or difficulty breathing? NO  ? New onset sore throat, myalgia (muscle aches and pains), headache, loss of taste/smell or diarrhea? NO  3. Have you had a potential exposure to COVID-19 within the past 14 days by:no  ? Close contact with a confirmed case? NO  ? Close contact with a healthcare worker,  or essential infrastructure worker (grocery store, [employer], gas station, public utilities or transportation)?no  ? Do you reside in a congregate setting such as; skilled nursing facility, adult home, correctional facility, homeless shelter or other institutional setting? NO  ? Have you had recent travel to a known COVID-19 hotspot? NO     Indicate if the patient has a positive screen by answering yes to one or more of the above questions.

## 2022-02-01 NOTE — PROGRESS NOTES
C-Difficile admission screening and protocol:       * Admitted with diarrhea? [] YES    [x]  NO     *Prior history of C-Diff. In last 3 months? [] YES    [x]  NO     *Antibiotic use in the past 6-8 weeks? [x]  NO    []  YES                 If yes, which ANTIBIOTIC AND REASON______     *Prior hospitalization or nursing home in the last month? []  YES    [x]  NO      [facility] PRE-OPERATIVE INSTRUCTIONS    Arrival time_0725___________        Surgery time_____0925_______    Take the following medications with a sip of water: Follow your MD/Surgeons pre-procedure instructions regarding your medications  Do not eat or drink anything after 12:00 midnight prior to your surgery. This includes water chewing gum, mints and ice chips. You may brush your teeth and gargle the morning of your surgery, but do not swallow the water     Please see your family doctor/pediatrician for a history and physical and/or concerning medications. Bring any test results/reports from your physicians office. If you are under the care of a heart doctor or specialist doctor, please be aware that you may be asked to them for clearance    You may be asked to stop blood thinners such as Coumadin, Plavix, Fragmin, Lovenox, etc., or any anti-inflammatories such as:  Aspirin, Ibuprofen, Advil, Naproxen prior to your surgery. We also ask that you stop any OTC medications such as fish oil, vitamin E, glucosamine, garlic, Multivitamins, COQ 10, etc.    We ask that you do not smoke 24 hours prior to surgery  We ask that you do not  drink any alcoholic beverages 24 hours prior to surgery     You must make arrangements for a responsible adult to take you home after your surgery. For your safety you will not be allowed to leave alone or drive yourself home. Your surgery will be cancelled if you do not have a ride home.      Also for your safety, it is strongly suggested that someone stay with you the first 24 hours after your surgery. A parent or legal guardian must accompany a child scheduled for surgery and plan to stay at the hospital until the child is discharged. Please do not bring other children with you. For your comfort, please wear simple loose fitting clothing to the hospital.  Please do not bring valuables. Do not wear any make-up or nail polish on your fingers or toes      For your safety, please do not wear any jewelry or body piercing's on the day of surgery. All jewelry must be removed. If you have dentures, they will be removed before going to operating room. For your convenience, we will provide you with a container. If you wear contact lenses or glasses, they will be removed, please bring a case for them. If you have a living will and a durable power of  for healthcare, please bring in a copy. As part of our patient safety program to minimize surgical site infections, we ask you to do the following:    · Please notify your surgeon if you develop any illness between         now and the  day of your surgery. · This includes a cough, cold, fever, sore throat, nausea,         or vomiting, and diarrhea, etc.  ·  Please notify your surgeon if you experience dizziness, shortness         of breath or blurred vision between now and the time of your surgery. Do not shave your operative site 96 hours prior to surgery. For face and neck surgery, men may use an electric razor 48 hours   prior to surgery. You may shower the night before surgery or the morning of   your surgery with an antibacterial soap.     You will need to bring a photo ID and insurance card    [facility] has an onsite pharmacy, would you like to utilize our pharmacy     If you will be staying overnight and use a C-pap machine, please bring   your C-pap to hospital     Our goal is to provide you with excellent care, therefore, visitors will be limited to two(2) in the room at a time so that we may focus on providing this care for you. Please contact pre-admission testing if you have any further questions. [facility] phone number:  [phone] PAT fax number:  [phone]  Please note these are generalized instructions for all surgical cases, you may be provided with more specific instructions according to your surgery. SAFETY FIRST. .call before you fall    Unfortunately due the rise in covid cases, staffing crisis and hospital capacity, your procedure may need to be rescheduled--if this were to happen your Surgeons office will notify you ASAP

## 2022-02-03 LAB — SARS-COV-2: NOT DETECTED

## 2022-02-07 ENCOUNTER — ANESTHESIA EVENT (OUTPATIENT)
Dept: OPERATING ROOM | Age: 67
End: 2022-02-07
Payer: MEDICARE

## 2022-02-08 ENCOUNTER — HOSPITAL ENCOUNTER (OUTPATIENT)
Age: 67
Setting detail: OUTPATIENT SURGERY
Discharge: HOME | End: 2022-02-08
Attending: OBSTETRICS & GYNECOLOGY | Admitting: OBSTETRICS & GYNECOLOGY
Payer: MEDICARE

## 2022-02-08 ENCOUNTER — ANESTHESIA (OUTPATIENT)
Dept: OPERATING ROOM | Age: 67
End: 2022-02-08
Payer: MEDICARE

## 2022-02-08 VITALS
BODY MASS INDEX: 30.3 KG/M2 | RESPIRATION RATE: 18 BRPM | HEART RATE: 79 BPM | HEIGHT: 63 IN | WEIGHT: 171 LBS | DIASTOLIC BLOOD PRESSURE: 70 MMHG | SYSTOLIC BLOOD PRESSURE: 149 MMHG | TEMPERATURE: 97.4 F | OXYGEN SATURATION: 95 %

## 2022-02-08 VITALS
OXYGEN SATURATION: 90 % | RESPIRATION RATE: 11 BRPM | DIASTOLIC BLOOD PRESSURE: 56 MMHG | SYSTOLIC BLOOD PRESSURE: 97 MMHG

## 2022-02-08 DIAGNOSIS — N95.0 POSTMENOPAUSAL BLEEDING: ICD-10-CM

## 2022-02-08 PROCEDURE — 7100000001 HC PACU RECOVERY - ADDTL 15 MIN: Performed by: OBSTETRICS & GYNECOLOGY

## 2022-02-08 PROCEDURE — 7100000000 HC PACU RECOVERY - FIRST 15 MIN: Performed by: OBSTETRICS & GYNECOLOGY

## 2022-02-08 PROCEDURE — 7100000010 HC PHASE II RECOVERY - FIRST 15 MIN: Performed by: OBSTETRICS & GYNECOLOGY

## 2022-02-08 PROCEDURE — 7100000011 HC PHASE II RECOVERY - ADDTL 15 MIN: Performed by: OBSTETRICS & GYNECOLOGY

## 2022-02-08 PROCEDURE — 3600000014 HC SURGERY LEVEL 4 ADDTL 15MIN: Performed by: OBSTETRICS & GYNECOLOGY

## 2022-02-08 PROCEDURE — 2580000003 HC RX 258: Performed by: OBSTETRICS & GYNECOLOGY

## 2022-02-08 PROCEDURE — 3600000004 HC SURGERY LEVEL 4 BASE: Performed by: OBSTETRICS & GYNECOLOGY

## 2022-02-08 PROCEDURE — 2709999900 HC NON-CHARGEABLE SUPPLY: Performed by: OBSTETRICS & GYNECOLOGY

## 2022-02-08 PROCEDURE — 2580000003 HC RX 258: Performed by: ANESTHESIOLOGY

## 2022-02-08 PROCEDURE — 6360000002 HC RX W HCPCS: Performed by: NURSE ANESTHETIST, CERTIFIED REGISTERED

## 2022-02-08 PROCEDURE — 3700000001 HC ADD 15 MINUTES (ANESTHESIA): Performed by: OBSTETRICS & GYNECOLOGY

## 2022-02-08 PROCEDURE — 3700000000 HC ANESTHESIA ATTENDED CARE: Performed by: OBSTETRICS & GYNECOLOGY

## 2022-02-08 PROCEDURE — 88305 TISSUE EXAM BY PATHOLOGIST: CPT

## 2022-02-08 PROCEDURE — A4217 STERILE WATER/SALINE, 500 ML: HCPCS | Performed by: OBSTETRICS & GYNECOLOGY

## 2022-02-08 PROCEDURE — 2500000003 HC RX 250 WO HCPCS: Performed by: NURSE ANESTHETIST, CERTIFIED REGISTERED

## 2022-02-08 RX ORDER — SODIUM CHLORIDE 9 MG/ML
25 INJECTION, SOLUTION INTRAVENOUS PRN
Status: DISCONTINUED | OUTPATIENT
Start: 2022-02-08 | End: 2022-02-08 | Stop reason: HOSPADM

## 2022-02-08 RX ORDER — SODIUM CHLORIDE 0.9 % (FLUSH) 0.9 %
10 SYRINGE (ML) INJECTION PRN
Status: DISCONTINUED | OUTPATIENT
Start: 2022-02-08 | End: 2022-02-08 | Stop reason: HOSPADM

## 2022-02-08 RX ORDER — MAGNESIUM HYDROXIDE 1200 MG/15ML
LIQUID ORAL CONTINUOUS PRN
Status: COMPLETED | OUTPATIENT
Start: 2022-02-08 | End: 2022-02-08

## 2022-02-08 RX ORDER — DEXAMETHASONE SODIUM PHOSPHATE 4 MG/ML
INJECTION, SOLUTION INTRA-ARTICULAR; INTRALESIONAL; INTRAMUSCULAR; INTRAVENOUS; SOFT TISSUE PRN
Status: DISCONTINUED | OUTPATIENT
Start: 2022-02-08 | End: 2022-02-08 | Stop reason: SDUPTHER

## 2022-02-08 RX ORDER — SUCCINYLCHOLINE CHLORIDE 20 MG/ML
INJECTION INTRAMUSCULAR; INTRAVENOUS PRN
Status: DISCONTINUED | OUTPATIENT
Start: 2022-02-08 | End: 2022-02-08 | Stop reason: SDUPTHER

## 2022-02-08 RX ORDER — ONDANSETRON 2 MG/ML
INJECTION INTRAMUSCULAR; INTRAVENOUS PRN
Status: DISCONTINUED | OUTPATIENT
Start: 2022-02-08 | End: 2022-02-08 | Stop reason: SDUPTHER

## 2022-02-08 RX ORDER — GLYCOPYRROLATE 0.2 MG/ML
INJECTION INTRAMUSCULAR; INTRAVENOUS PRN
Status: DISCONTINUED | OUTPATIENT
Start: 2022-02-08 | End: 2022-02-08 | Stop reason: SDUPTHER

## 2022-02-08 RX ORDER — PROPOFOL 10 MG/ML
INJECTION, EMULSION INTRAVENOUS PRN
Status: DISCONTINUED | OUTPATIENT
Start: 2022-02-08 | End: 2022-02-08 | Stop reason: SDUPTHER

## 2022-02-08 RX ORDER — MIDAZOLAM HYDROCHLORIDE 1 MG/ML
INJECTION INTRAMUSCULAR; INTRAVENOUS PRN
Status: DISCONTINUED | OUTPATIENT
Start: 2022-02-08 | End: 2022-02-08 | Stop reason: SDUPTHER

## 2022-02-08 RX ORDER — SODIUM CHLORIDE 0.9 % (FLUSH) 0.9 %
10 SYRINGE (ML) INJECTION EVERY 12 HOURS SCHEDULED
Status: DISCONTINUED | OUTPATIENT
Start: 2022-02-08 | End: 2022-02-08 | Stop reason: HOSPADM

## 2022-02-08 RX ORDER — KETOROLAC TROMETHAMINE 30 MG/ML
INJECTION, SOLUTION INTRAMUSCULAR; INTRAVENOUS PRN
Status: DISCONTINUED | OUTPATIENT
Start: 2022-02-08 | End: 2022-02-08 | Stop reason: SDUPTHER

## 2022-02-08 RX ORDER — ROCURONIUM BROMIDE 10 MG/ML
INJECTION, SOLUTION INTRAVENOUS PRN
Status: DISCONTINUED | OUTPATIENT
Start: 2022-02-08 | End: 2022-02-08 | Stop reason: SDUPTHER

## 2022-02-08 RX ORDER — SODIUM CHLORIDE 9 MG/ML
INJECTION, SOLUTION INTRAVENOUS CONTINUOUS
Status: DISCONTINUED | OUTPATIENT
Start: 2022-02-08 | End: 2022-02-08 | Stop reason: HOSPADM

## 2022-02-08 RX ORDER — FENTANYL CITRATE 50 UG/ML
INJECTION, SOLUTION INTRAMUSCULAR; INTRAVENOUS PRN
Status: DISCONTINUED | OUTPATIENT
Start: 2022-02-08 | End: 2022-02-08 | Stop reason: SDUPTHER

## 2022-02-08 RX ORDER — LIDOCAINE HYDROCHLORIDE 20 MG/ML
INJECTION, SOLUTION EPIDURAL; INFILTRATION; INTRACAUDAL; PERINEURAL PRN
Status: DISCONTINUED | OUTPATIENT
Start: 2022-02-08 | End: 2022-02-08 | Stop reason: SDUPTHER

## 2022-02-08 RX ADMIN — SUCCINYLCHOLINE CHLORIDE 100 MG: 20 INJECTION, SOLUTION INTRAMUSCULAR; INTRAVENOUS at 09:54

## 2022-02-08 RX ADMIN — PROPOFOL 50 MG: 10 INJECTION, EMULSION INTRAVENOUS at 09:50

## 2022-02-08 RX ADMIN — MIDAZOLAM 2 MG: 1 INJECTION INTRAMUSCULAR; INTRAVENOUS at 09:43

## 2022-02-08 RX ADMIN — GLYCOPYRROLATE 0.2 MG: 0.2 INJECTION, SOLUTION INTRAMUSCULAR; INTRAVENOUS at 10:05

## 2022-02-08 RX ADMIN — PROPOFOL 200 MG: 10 INJECTION, EMULSION INTRAVENOUS at 09:43

## 2022-02-08 RX ADMIN — GLYCOPYRROLATE 0.2 MG: 0.2 INJECTION, SOLUTION INTRAMUSCULAR; INTRAVENOUS at 10:08

## 2022-02-08 RX ADMIN — KETOROLAC TROMETHAMINE 30 MG: 30 INJECTION, SOLUTION INTRAMUSCULAR at 10:04

## 2022-02-08 RX ADMIN — ONDANSETRON 4 MG: 2 INJECTION INTRAMUSCULAR; INTRAVENOUS at 09:43

## 2022-02-08 RX ADMIN — FENTANYL CITRATE 100 MCG: 50 INJECTION INTRAMUSCULAR; INTRAVENOUS at 09:51

## 2022-02-08 RX ADMIN — DEXAMETHASONE SODIUM PHOSPHATE 10 MG: 4 INJECTION, SOLUTION INTRAMUSCULAR; INTRAVENOUS at 09:43

## 2022-02-08 RX ADMIN — SUGAMMADEX 200 MG: 100 INJECTION, SOLUTION INTRAVENOUS at 10:17

## 2022-02-08 RX ADMIN — ROCURONIUM BROMIDE 10 MG: 10 SOLUTION INTRAVENOUS at 10:10

## 2022-02-08 RX ADMIN — SODIUM CHLORIDE: 9 INJECTION, SOLUTION INTRAVENOUS at 09:43

## 2022-02-08 RX ADMIN — LIDOCAINE HYDROCHLORIDE 100 MG: 20 INJECTION, SOLUTION EPIDURAL; INFILTRATION; INTRACAUDAL; PERINEURAL at 09:43

## 2022-02-08 ASSESSMENT — PULMONARY FUNCTION TESTS
PIF_VALUE: 1
PIF_VALUE: 1
PIF_VALUE: 26
PIF_VALUE: 30
PIF_VALUE: 2
PIF_VALUE: 2
PIF_VALUE: 27
PIF_VALUE: 4
PIF_VALUE: 33
PIF_VALUE: 2
PIF_VALUE: 30
PIF_VALUE: 32
PIF_VALUE: 1
PIF_VALUE: 32
PIF_VALUE: 8
PIF_VALUE: 1
PIF_VALUE: 2
PIF_VALUE: 28
PIF_VALUE: 30
PIF_VALUE: 4
PIF_VALUE: 32
PIF_VALUE: 2
PIF_VALUE: 33
PIF_VALUE: 24
PIF_VALUE: 29
PIF_VALUE: 2
PIF_VALUE: 29
PIF_VALUE: 30
PIF_VALUE: 2
PIF_VALUE: 1
PIF_VALUE: 1
PIF_VALUE: 0
PIF_VALUE: 31
PIF_VALUE: 14
PIF_VALUE: 29
PIF_VALUE: 33
PIF_VALUE: 0
PIF_VALUE: 2
PIF_VALUE: 32
PIF_VALUE: 1
PIF_VALUE: 27

## 2022-02-08 ASSESSMENT — PAIN DESCRIPTION - LOCATION
LOCATION: ABDOMEN
LOCATION: ABDOMEN

## 2022-02-08 ASSESSMENT — PAIN SCALES - GENERAL
PAINLEVEL_OUTOF10: 0
PAINLEVEL_OUTOF10: 1

## 2022-02-08 ASSESSMENT — PAIN DESCRIPTION - ONSET: ONSET: GRADUAL

## 2022-02-08 ASSESSMENT — PAIN DESCRIPTION - PAIN TYPE
TYPE: SURGICAL PAIN
TYPE: SURGICAL PAIN

## 2022-02-08 ASSESSMENT — PAIN - FUNCTIONAL ASSESSMENT: PAIN_FUNCTIONAL_ASSESSMENT: 0-10

## 2022-02-08 ASSESSMENT — PAIN DESCRIPTION - FREQUENCY
FREQUENCY: CONTINUOUS
FREQUENCY: INTERMITTENT

## 2022-02-08 ASSESSMENT — PAIN DESCRIPTION - PROGRESSION: CLINICAL_PROGRESSION: NOT CHANGED

## 2022-02-08 ASSESSMENT — ENCOUNTER SYMPTOMS: SHORTNESS OF BREATH: 0

## 2022-02-08 ASSESSMENT — PAIN DESCRIPTION - DESCRIPTORS: DESCRIPTORS: DISCOMFORT

## 2022-02-08 NOTE — OP NOTE
OPERATIVE REPORT    Date of surgery: 2/8/2022  Pre-operative Diagnosis: postmenopausal bleeding, thickened endometrium  Post-operative Diagnosis: the same  Procedure: Hysteroscopy and D&C  Anesthesia: general  Surgeon: Veto Oliver  Findings: endometrial polyp  Complications: none  Specimens: endometrial curettings and polyp  Urine Output: 50ml mL  Estimated blood loss: less than 50ml mL     INDICATIONS:  Patient is Anuel Conner  is a 77 y.o. female who presented to operating room for scheduled hysteroscopy and D&C. She has a history of postmenopausal bleeding. Pt was unable to tolerate vaginal exam or ultrasound. MRI revealed thickened endometrium. Based on the above, the decision was made to proceed with hysteroscopy and D&C. Prior to the procedure we discussed risks, benefits and possible complications of  procedure which included, but were not limited to risk of uterine perforation. Patient accepted the risks and elected to proceed with procedure. PROCEDURE: The patient was taken to the operating room with IV running and pneumo boots applied to the lower extremities. General anesthesia was administered without difficulty and was adequate. The patient was then placed in the dorsal lithotomy position with her legs in candy-cane stirrups. An examination under anesthesia revealed a mobile retroverted  uterus with regular surface. The patient was then prepped and draped in a regular sterile fashion. Time-out was performed. Two [place]ns retractors were utilized in the patient's vagina, cervix was visualized and  grabbed with a single-tooth tenaculum at the anterior lip. The cervical canal was  sequentially dilated to accommodate 5 mm hysteroscope. The cervix was stenotic, requiring use of the lacrimal duct probes to dilate. The uterus was then carefully sounded to 8 cm.   A 5 mm 0 degree hysteroscope was gently advanced into the uterine cavity under direct visualization and uterus was distended with normal saline. A polyp was noted. Next, hysteroscope was withdrawn and a polyp forceps were used to remove the endometrial polyp. A medium-size curette was then used to gently curettage the uterine cavity in a clockwise fashion in all aspects of the uterus Curettings were sent to Pathology for permanent section. The hysteroscope was then reintroduced into the cavity to ensure that the entire polyp had been removed. Following this, the procedure was abandoned. The tenaculum was removed from the cervix and good hemostasis was noted at puncture site. The patient tolerated the procedure well. The instrument and sponge counts were correct x 2. The patient was awakened from general anesthesia and taken to the recovery room in satisfactory condition.

## 2022-02-08 NOTE — ANESTHESIA PRE PROCEDURE
Department of Anesthesiology  Preprocedure Note       Name:  Terry Méndez   Age:  77 y.o.  :  1955                                          MRN:  [id number]         Date:  2022      Surgeon: Lizette Rader):  Sudha Veliz MD    Procedure: HYSTEROSCOPY DILATION AND CURETTAGE (N/A )    Medications prior to admission:   Prior to Admission medications    Medication Sig Start Date End Date Taking? Authorizing Provider   oxybutynin (DITROPAN) 5 MG tablet Take 5 mg by mouth at bedtime    Historical Provider, MD   rosuvastatin (CRESTOR) 5 MG tablet Take 5 mg by mouth at bedtime    Historical Provider, MD   vitamin B-12 (CYANOCOBALAMIN) 1000 MCG tablet Take 1,000 mcg by mouth every evening    Historical Provider, MD   Cholecalciferol (VITAMIN D3) 125 MCG (5000 UT) TABS Take 1 tablet by mouth every evening    Historical Provider, MD   ibuprofen (ADVIL;MOTRIN) 600 MG tablet Take 600 mg by mouth at bedtime    Historical Provider, MD   omeprazole 20 MG EC tablet Take 20 mg by mouth daily Take DOS with sip of water 5/18/15   Historical Provider, MD   carvedilol (COREG) 12.5 MG tablet 2 times daily (with meals)  18   Historical Provider, MD   clobetasol (TEMOVATE) 0.05 % ointment Apply topically three times a week Apply topically-MON,WED,FRI    Historical Provider, MD   citalopram (CELEXA) 20 MG tablet Take 40 mg by mouth daily     Historical Provider, MD       Current medications:    No current facility-administered medications for this visit. No current outpatient medications on file.      Facility-Administered Medications Ordered in Other Visits   Medication Dose Route Frequency Provider Last Rate Last Admin    0.9 % sodium chloride infusion   IntraVENous Continuous Mariana Meza MD        sodium chloride flush 0.9 % injection 10 mL  10 mL IntraVENous 2 times per day Mariana Meza MD        sodium chloride flush 0.9 % injection 10 mL  10 mL IntraVENous PRN Konstantin Gonzáles Cliff Valdivia MD        0.9 % sodium chloride infusion  25 mL IntraVENous PRN Mana Quiñones MD           Allergies: Allergies   Allergen Reactions    Codeine Nausea And Vomiting     N/V x`1 but \"can tolerate since\"       Problem List:    Patient Active Problem List   Diagnosis Code    Trigger finger, acquired M65.30    Other tenosynovitis of hand and wrist M65.849, M65.839    Ganglion of tendon sheath M67.40       Past Medical History:        Diagnosis Date    Acid reflux     Arthritis     Depression     High blood pressure     Hyperlipidemia     Melanoma ([address]     BACK GRADE 0    Oral lichen planus     Prolonged emergence from general anesthesia     FAMILY HX-BROTHER-VERY LONG TIME TO WAKE UP AND PER PATIENT GETS A LITTLE GOOFY    Urinary incontinence     wears depends    Wears glasses        Past Surgical History:        Procedure Laterality Date    CARPAL TUNNEL RELEASE  2003    bilateral    COLONOSCOPY      FINGER TRIGGER RELEASE Right 2009    middle    FINGER TRIGGER RELEASE Right 10/29/2015    with Excision of retinacular ganglion cyst flexor tendon sheath-ring finger    FINGER TRIGGER RELEASE Left 5/2/2019    LEFT RING FINGER TRIGGER FINGER RELEASE performed by Renata Hernandez MD at [facility] ARTHROSCOPY Right     meniscus       Social History:    Social History     Tobacco Use    Smoking status: Never Smoker    Smokeless tobacco: Never Used   Substance Use Topics    Alcohol use: No                                Counseling given: Not Answered      Vital Signs (Current): There were no vitals filed for this visit.                                            BP Readings from Last 3 Encounters:   02/08/22 (!) 143/53   05/02/19 (!) 109/54   05/02/19 108/60       NPO Status:                                                                                 BMI:   Wt Readings from Last 3 Encounters:   02/08/22 171 lb (77.6 kg)   05/13/19 277 lb (125.6 kg)   05/02/19 277 lb 5.4 oz (125.8 kg)     There is no height or weight on file to calculate BMI.    CBC:   Lab Results   Component Value Date    WBC 7.7 11/12/2012    RBC 4.56 11/12/2012    HGB 13.9 11/12/2012    HCT 40.3 11/12/2012    MCV 88.4 11/12/2012    RDW 13.8 11/12/2012     11/12/2012       CMP:   Lab Results   Component Value Date     10/29/2015    K 5.1 10/29/2015     10/29/2015    CO2 24 10/29/2015    BUN 12 10/29/2015    CREATININE 0.5 10/29/2015    GFRAA >60 10/29/2015    GFRAA >60 11/12/2012    LABGLOM >60 10/29/2015    GLUCOSE 94 10/29/2015    CALCIUM 9.1 10/29/2015       POC Tests: No results for input(s): POCGLU, POCNA, POCK, POCCL, POCBUN, POCHEMO, POCHCT in the last 72 hours. Coags:   Lab Results   Component Value Date    PROTIME 10.7 11/12/2012    INR 0.94 11/12/2012    APTT 29.5 11/12/2012       HCG (If Applicable): No results found for: PREGTESTUR, PREGSERUM, HCG, HCGQUANT     ABGs: No results found for: PHART, PO2ART, MKF9FXF, NNH1UCP, BEART, S0JEEJXR     Type & Screen (If Applicable):  No results found for: [facility]    Anesthesia Evaluation  Patient summary reviewed no history of anesthetic complications:   Airway: Mallampati: II  TM distance: >3 FB   Neck ROM: full  Mouth opening: > = 3 FB Dental:      Comment: Small chip noted on upper incisor. No loose teeth    Pulmonary:       (-) COPD, asthma, shortness of breath, recent URI, sleep apnea, rhonchi, wheezes and rales                           Cardiovascular:  Exercise tolerance: no interval change, Retired   (+) hypertension:, hyperlipidemia    (-) valvular problems/murmurs, past MI, CAD, CABG/stent, dysrhythmias,  angina, murmur and no pulmonary hypertension      Rhythm: regular  Rate: normal           Beta Blocker:  Dose within 24 Hrs      ROS comment: Echocardiogram (2020):  Conclusions   - Left ventricle:  The cavity size is normal. Wall thickness is normal. Systolic function was normal. The estimated ejection fraction was in the range of 54% to 58%. Wall motion was normal; there were no regional wall motion abnormalities. Doppler parameters are consistent with abnormal left ventricular relaxation (grade 1 diastolic dysfunction). The global longitudinal strain was -23%. - Inferior vena cava: The vessel was normal in size; the respirophasic diameter changes were in the normal range; findings are consistent with normal central venous pressure. Neuro/Psych:   (+) depression/anxiety    (-) seizures, TIA and CVA           GI/Hepatic/Renal:   (+) GERD: well controlled,      (-) PUD, hepatitis, liver disease and no renal disease       Endo/Other:        (-) diabetes mellitus, hypothyroidism, hyperthyroidism, blood dyscrasia               Abdominal:   (+) obese,     Abdomen: soft. Vascular: Other Findings:             Anesthesia Plan      general     ASA 3     (NPO appropriate; Ms. Adelfo Hernandez denies active nausea / reflux.)  Induction: intravenous. Anesthetic plan and risks discussed with patient. Plan discussed with CRNA. This pre-anesthesia assessment may be used as a history and physical.    DOS STAFF ADDENDUM:    Pt seen and examined, chart reviewed (including anesthesia, drug and allergy history). No interval changes to history and physical examination. Anesthetic plan, risks, benefits, alternatives, and personnel involved discussed with patient. Patient verbalized an understanding and agrees to proceed.       Lesli Bobo MD  February 8, 2022  8:36 AM

## 2022-02-08 NOTE — PROGRESS NOTES
Assisted up to chair. Discharge instructions given to patient and friend. Verbalize understanding. No complaints.

## 2022-02-08 NOTE — OP NOTE
Operative Note      Patient: Jared Curtis  YOB: 1955  MRN: [id number]    Date of Procedure: 2/8/2022    Pre-Op Diagnosis: POSTMENOPAUSAL BLEEDING    Post-Op Diagnosis: Same  + endometrial polyp     Procedure(s):   HYSTEROSCOPY DILATION AND CURETTAGE    Surgeon(s):  Cesia Mcfarlane MD    Assistant:   Surgical Assistant: Janine Love    Anesthesia: General    Estimated Blood Loss (mL): less than 50     Complications: None    Specimens:   ID Type Source Tests Collected by Time Destination   A : A) ENDOMETRIAL CURETTINGS Tissue Tissue SURGICAL PATHOLOGY Cesia Mcfarlane MD 2/8/2022 1015        Implants:  * No implants in log *      Drains: * No LDAs found *    Findings: endometrial polyp    Detailed Description of Procedure:   See dictation    Electronically signed by Cesia Mcfarlane MD on 2/8/2022 at 10:19 AM

## 2022-02-08 NOTE — PROGRESS NOTES
Vaginal Sweep Documentation     Vaginal prep sponge count performed by [facility] RN and [address]. Count correct. Vaginal sweep performed by DR. BARBOZA  at 1020. No foreign objects or vaginal tears noted.

## 2022-08-01 ENCOUNTER — HOSPITAL ENCOUNTER (OUTPATIENT)
Dept: PHYSICAL THERAPY | Age: 67
Setting detail: THERAPIES SERIES
Discharge: HOME | End: 2022-08-01
Payer: MEDICARE

## 2022-08-01 PROCEDURE — 97161 PT EVAL LOW COMPLEX 20 MIN: CPT

## 2022-08-01 PROCEDURE — 97530 THERAPEUTIC ACTIVITIES: CPT

## 2022-08-01 PROCEDURE — 95992 CANALITH REPOSITIONING PROC: CPT

## 2022-08-01 NOTE — PLAN OF CARE
[facility]. [address]  Phone: [phone]   Fax:     [phone]          Physical Therapy Certification    Dear Referring Provider (secondary): Elsa Adams MD,    We had the pleasure of evaluating the following patient for physical therapy services at [facility]. A summary of our findings can be found in the initial assessment below. This includes our plan of care. If you have any questions or concerns regarding these findings, please do not hesitate to contact me at the office phone number checked above. Thank you for the referral.       Physician Signature:_______________________________Date:__________________  By signing above (or electronic signature), therapists plan is approved by physician        Patient: Rahul Jose   : 1955   MRN: [id number]  Referring Physician: Referring Provider (secondary): Elsa Adams MD      Evaluation Date: 2022        Medical Diagnosis Information:  Diagnosis: H81.10,  BPPV, unspecified   Treatment Diagnosis: Vestibular Impairment, BPPV                                           Insurance information: PT Insurance Information: Medicare     Precautions/ Contra-indications:   Latex Allergy:  [x]NO      []YES  Preferred Language for Healthcare:   [x]English       []other:    C-SSRS Triggered by Intake questionnaire (Past 2 wk assessment ):   [x] No, Questionnaire did not trigger screening.   [] Yes, Patient intake triggered C-SSRS Screening      [] C-SSRS Screening completed  [] PCP notified via Epic     SUBJECTIVE: Patient stated complaint: Pt with history of vertigo in the past which was treated with epley maneuver. Pt states vertigo started 6-7 weeks ago. Pt states she rolled over in bed and got a spinning sensation. Pt states this lasts for just a few seconds.   Pt notes when she lays down flat in bed she gets dizzy and when she rolls onto her right side. If she moves any other direction she is fine. Pt denies any balance issues with walking. Relevant Medical History: Additional Pertinent Hx: HTN, melanoma, OA, COVID (4 weeks ago), depression, R knee scope  Pain Scale: 0/10     Type: []Constant   []Intermittent  []Radiating []Localized []other:    Dizziness Scale: 3/10    Description of symptoms: [x] Vertigo []  Off-balance [] Lightheadedness    Symptoms are getting:  [] Better [] Worse  [x] Same      [] Episodic    Description of Spells: [] Constant [] Spontaneous [] Motion Induced [x] Induced by position changes [] Other:    Length of time spells occur: [x] Seconds [] Minutes  [] Hours [] Days [] Other:     Setting in which symptoms first occurred: rolling onto right side in bed    What increases/provokes symptoms? Laying down flat in bed and rolling onto right side    What decreases/eases symptoms?  Not moving    Hearing impairments:  [] Yes  [x] No  [] Other:     Hearing changes since onset:  [] Yes  [x] No  [] Other:    Visual changes since onset: [] Yes  [x] No  [] Other:    Recent falls:    [x] Yes  [] No     Comments: fell trimming bushes on a hillside with wet grass, dog tripped her at night      History of migraines/HA:  [] Yes  [x] No    Comments:    Previous treatments: epley maneuver which was successful    Job requirements/work status: retired    Living Status/Prior Level of Function: Prior to this injury / incident, patient was independent with ADLs and IADLs, able to roll over in bed    OBJECTIVE:     Musculoskeletal Screen  Cervical spine complaints:   Cervical Pain: 0/10  Cervical spine ROM:  [x]  WNL [] Impaired:    LE Strength: B=5/5    Somatosensory:  Light touch:  [x] Normal [] Impaired Comments:    Coordination:  Rapid alternating movements: [x] Normal [] Dysdiadokinesia   Finger to Nose:   [x] Normal [] Dysmetric  Heel to Shin:    [x] Normal [] Ataxic      Postural Control Co-morbidities/Complexities (which will affect course of rehabilitation):   []None           Arthritic conditions   []Rheumatoid arthritis (M05.9)  [x]Osteoarthritis (M19.91)   Cardiovascular conditions   [x]Hypertension (I10)  []Hyperlipidemia (E78.5)  []Angina pectoris (I20)  []Atherosclerosis (I70)   Musculoskeletal conditions   []Disc pathology   []Congenital spine pathologies   []Prior surgical intervention  []Osteoporosis (M81.8)  []Osteopenia (M85.8)   Endocrine conditions   []Hypothyroid (E03.9)  []Hyperthyroid Gastrointestinal conditions   []Constipation (M26.12)   Metabolic conditions   []Morbid obesity (E66.01)  []Diabetes type 1(E10.65) or 2 (E11.65)   []Neuropathy (G60.9)     Pulmonary conditions   []Asthma (J45)  []Coughing   []COPD (J44.9)   Psychological Disorders  []Anxiety (F41.9)  [x]Depression (F32.9)   []Other:   []Other:           Barriers to/and or personal factors that will affect rehab potential:              []Age  []Sex    []Smoker              []Motivation/Lack of Motivation                        [x]Co-Morbidities              []Cognitive Function, education/learning barriers              []Environmental, home barriers              []profession/work barriers  [x]past PT/medical experience  []other:  Justification: h/o BPPV in the past    ASSESSMENT:      Functional Impairments:  Problem List/Functional Limitations:   [x] BPPV    [x] Right [x] Posterior Canal [x] Canalithiasis    [] Left  [] Horizontal Canal [] Cupulolithiasis   [] Decreased Gaze Stabilization    [] Increased Motion Sensitivity   [] Unilateral Vestibular Hypofunction [] Bilateral Vestibular Hypofunction   [] Gait Instability    [x] Decreased tolerance for ADLs    [] Decreased functional strength   [x] Reduced Balance/Proprioceptive control   [] Reduced ability to hear/focus   [] Noted cervical/thoracic/GHJ joint hypomobility   [] Noted cervical/thoracic/GHJ joint hypermobility   [] Decreased cervical/UE functional ROM   [] Noted Headache pain aggravated by neck movements with/without dizziness   [] Abnormal reflexes/sensation/myotomal/dermatomal deficits   [] Decreased DCF control or ability to hold head up   [] Decreased RC/scapular/core strength and neuromuscular control     Functional Activity Limitations (from functional questionnaire and intake)   []Reduced ability to tolerate prolonged functional positions   [x]Reduced ability or difficulty with changes of positions or transfers between positions  [x]Reduced ability to transfer in/out of bed or rolling in bed   []Reduced ability or tolerance with driving, reading and/or computer work   []Reduced ability to perform lifting, reaching, carrying tasks   []Reduced ability to forward bend   []Reduced ability to ambulate prolonged functional periods/distances/surfaces   []Reduced ability to ascend/descend stairs  []Reduced ability to concentrate/focus  [x]Reduced ability to turn/pitch head rapidly  []Reduced ability to self-correct for losses of balance []other:       Participation Restrictions   [x]Reduced participation in self-care activities   [x]Reduced participation in home management activities   []Reduced participation in work activities   []Reduced participation in social activities   []Reduced participation in sport/recreational activities    Classification:   [x]Signs/symptoms consistent with BPPV (benign paroxysmal positional vertigo)      []Signs/symptoms consistent with unilateral peripheral vestibulopathy (i.e., vestibular neuritis, labyrinthitis, acoustic neuroma)   []Signs/symptoms consistent with central vestibulopathy  []Signs/symptoms consistent with migraine-related vestibulopathy  []Signs/symptoms consistent with Meniere's disease / post-traumatic hydrops  []Signs/symptoms consistent with perilymphatic fistula   []Signs/symptoms consistent with cervicogenic dizziness  []Signs/symptoms consistent with gait instability   []Signs/symptoms consistent with motion sensitivity    []signs/symptoms consistent with neck pain with mobility deficits     []signs/symptoms consistent with neck pain with movement coordinated impairments    []signs/symptoms consistent with neck pain with radiating pain    []signs/symptoms consistent with neck pain with headaches (cervicogenic)    []Signs/symptoms consistent with nerve root involvement including myotome & dermatome dysfunction   []sign/symptoms consistent with facet dysfunction of cervical and thoracic spine    []signs/symptoms consistent suggesting central cord compression/UMN syndromes   []signs/symptoms consistent with discogenic cervical pain   []signs/symptoms consistent with rib dysfunction   []signs/symptoms consistent with postural dysfunction   []signs/symptoms consistent with shoulder pathology    []signs/symptoms consistent with post-surgical status including decreased ROM, strength and function.    []signs/symptoms consistent with pathology which may benefit from Dry Needling  []signs/symptoms which may limit the use of advanced manual therapy techniques: (Elevated CV risk profile, recent trauma, intolerance to end range positions, prior TIA, visual issues, UE neurological compromise)   []other:      Prognosis/Rehab Potential:      [x]Excellent   []Good    []Fair   []Poor    Tolerance of evaluation/treatment:    []Excellent   [x]Good    []Fair   []Poor     Physical Therapy Evaluation Complexity Justification  [x] A history of present problem with:  [x] no personal factors and/or comorbidities that impact the plan of care;  []1-2 personal factors and/or comorbidities that impact the plan of care  []3 personal factors and/or comorbidities that impact the plan of care  [x] An examination of body systems using standardized tests and measures addressing any of the following: body structures and functions (impairments), activity limitations, and/or participation restrictions;:  [x] a total of 1-2 or more elements   [] a total of 3 or more elements   [] a total of 4 or more elements   [x] A clinical presentation with:  [x] stable and/or uncomplicated characteristics   [] evolving clinical presentation with changing characteristics  [] unstable and unpredictable characteristics;   [x] Clinical decision making of [x] low, [] moderate, [] high complexity using standardized patient assessment instrument and/or measurable assessment of functional outcome. [x] EVAL (LOW) 27919 (typically 15 minutes face-to-face)  [] EVAL (MOD) 91231 (typically 30 minutes face-to-face)  [] EVAL (HIGH) 43651 (typically 45 minutes face-to-face)  [] RE-EVAL     PLAN:   Today's Treatment:    [x] See flowsheet   [x] Patient treated with canalith repositioning maneuver   [x] Education materials provided on BPPV/Vestibular Dysfunction/Habituation   [x] Precautions provided and patient to follow precautions for next 24 hours in regards to BPPV management   [] Written home exercise instructions   [] Other:    Frequency/Duration:  1-2 days per week for 4 Weeks:  Interventions:  [x]  Therapeutic exercise including: strength training, ROM, for LEs, cervical spine & UEs  [x]  NMR activation and proprioception for BLEs, vestibular training/habituation, balance, coordination  [x]  Manual therapy as indicated via: canalith repositioning maneuvers, Dry Needling/IASTM, STM, PROM, Gr I-IV mobilizations, manipulation. [x]  Modalities as needed that may include: thermal agents, E-stim, Biofeedback, US, iontophoresis as indicated  [x]  Gait training  [x]  Patient education on BPPV/vestibular function, balance, postural re-education, activity modification, progression of HEP. HEP instruction: Written HEP instructions provided and reviewed. GOALS:  Patient stated goal: \"Get rid of my dizziness\"  [] Progressing: [] Met: [] Not Met: [] Adjusted    Therapist goals for Patient:   Short Term Goals: To be achieved in: 2 weeks  1.  Independent in HEP and progression per patient tolerance, in order to prevent re-injury. [] Progressing: [] Met: [] Not Met: [] Adjusted  2. Patient will have a decrease in dizziness/imbalance/symptoms by 40% to facilitate improvement in movement, function, balance and ADLs as indicated by Functional Deficits. [] Progressing: [] Met: [] Not Met: [] Adjusted    Long Term Goals: To be achieved in: at discharge  1. Increase FOTO functional outcome score from 67 to 70  to assist with reaching prior level of function. [] Progressing: [] Met: [] Not Met: [] Adjusted  2. Patient will demonstrate negative oculomotor special testing/positional testing as indicated by patients Functional Deficits. [] Progressing: [] Met: [] Not Met: [] Adjusted  3. Patient will return to functional activities including being able to lay down in bed without increased symptoms or restriction. [] Progressing: [] Met: [] Not Met: [] Adjusted  4. Pt will be able to roll over in bed without increased symptoms or restriction.   [] Progressing: [] Met: [] Not Met: [] Adjusted     Electronically signed by:  Linda Abdullahi , OMT-C,  [id number]

## 2022-08-01 NOTE — FLOWSHEET NOTE
[address] [facility] [address]  Phone: [phone]   Fax:     [phone]    Physical Therapy Treatment Note/ Progress Report:   Date:  2022    Patient Name:  Mariel Meier    :  1955  MRN: [id number]    Pertinent Medical History: HTN, melanoma, OA, COVID (4 weeks ago), depression, R knee scope    Medical/Treatment Diagnosis Information:  Diagnosis: H81.10,  BPPV, unspecified  Treatment Diagnosis: Vestibular Impairment, BPPV    Insurance/Certification information:  PT Insurance Information: Medicare  Physician Information:  Referring Provider (secondary): Ruth Alcazar MD  Plan of care signed (Y/N): []  Yes [x]  No     Date of Patient follow up with Physician:      Progress Report: []  Yes  [x]  No     Date Range for reporting period:  Beginnin2022  Ending:     Progress report due (10 Rx/or 30 days whichever is less):      Recertification due (POC duration/ or 90 days whichever is less):      Visit # Insurance Allowable Auth required?  B[place]n  KX modifier at visit 15 []  Yes [x]  No     Latex Allergy:  [x]NO      []YES  Preferred Language for Healthcare:   [x]English       []other:    Functional Scale:      Date assessed: at eval  Test: FOTO - vestibular  Score: 67    Dizziness level:  3/10     History of Injury:Pt with history of vertigo in the past which was treated with epley maneuver. Pt states vertigo started 6-7 weeks ago. Pt states she rolled over in bed and got a spinning sensation. Pt states this lasts for just a few seconds. Pt notes when she lays down flat in bed she gets dizzy and when she rolls onto her right side. If she moves any other direction she is fine. Pt denies any balance issues with walking.     SUBJECTIVE:  See eval    OBJECTIVE:   Positional Testing  R Hallpike-Jethro maneuver:              Nystagmus:     [x] Yes             [] No [x] Duration:    10 secs                                      [x] Direction:    Upbeating to the right              Vertigo:            [x] Yes             [] No               [x] Duration: 10 secs  L Hallpike-Munday maneuver:              Nystagmus:     [] Yes             [x] No               [] Duration:                                          [] Direction:                  Vertigo:            [x] Yes             [] No               [] Duration: 3 secs  Supine roll head right:              Nystagmus:     [] Yes             [x] No               [] Duration:                                          [] Direction:                  Vertigo:            [] Yes             [x] No               [] Duration:   Supine roll head left:              Nystagmus:     [] Yes             [x] No               [] Duration:                                          [] Direction:                  Vertigo:            [] Yes             [x] No               [] Duration:       RESTRICTIONS/PRECAUTIONS:     Exercises/Interventions:     Therapeutic Exercises (99514) Min: Resistance/Reps Notes/Cues                                                Therapeutic Activities (33022) Min: 10     Pt education X10 mins on BBPV pathology, role of PT,  and the effects on daily activities. Neuromuscular Re-ed (94289) Min: 15     CRP  2x - for R posterior canalithiasis - epley maneuver Significant reduction and vertigo s/s per pt after 1st maneuver                            Manual Intervention (27481) Min:                Modalities  Min:                      Other Therapeutic Activities: Pt was educated on PT POC, Diagnosis, Prognosis, pathomechanics as well as frequency and duration of scheduling future physical therapy appointments. Time was also taken on this day to answer all patient questions and participation in PT. Reviewed appointment policy in detail with patient and patient verbalized understanding.      Home Exercise Program: Therapeutic Exercise and NMR EXR  [] (49739) Provided verbal/tactile cueing for activities related to strengthening, flexibility, endurance, ROM for improvements in LE, proximal hip, and core control with self care, mobility, lifting, ambulation.  [] (99492) Provided verbal/tactile cueing for activities related to improving balance, coordination, kinesthetic sense, posture, motor skill, proprioception  to assist with LE, proximal hip, and core control in self care, mobility, lifting, ambulation and eccentric single leg control.  [address] and Therapeutic Activities:    [x] (53962 or 86363) Provided verbal/tactile cueing for activities related to improving balance, coordination, kinesthetic sense, posture, motor skill, proprioception and motor activation to allow for proper function of core, proximal hip and LE with self care and ADLs and functional mobility.   [] (53816) Gait Re-education- Provided training and instruction to the patient for proper LE, core and proximal hip recruitment and positioning and eccentric body weight control with ambulation re-education including up and down stairs     Home Exercise Program:    [] (43165) Reviewed/Progressed HEP activities related to strengthening, flexibility, endurance, ROM of core, proximal hip and LE for functional self-care, mobility, lifting and ambulation/stair navigation   [] (08377)Reviewed/Progressed HEP activities related to improving balance, coordination, kinesthetic sense, posture, motor skill, proprioception of core, proximal hip and LE for self care, mobility, lifting, and ambulation/stair navigation      Manual Treatments:  PROM / STM / Oscillations-Mobs:  G-I, II, III, IV (PA's, Inf., Post.)  [] (74232) Provided manual therapy to mobilize LE, proximal hip and/or LS spine soft tissue/joints for the purpose of modulating pain, promoting relaxation,  increasing ROM, reducing/eliminating soft tissue swelling/inflammation/restriction, improving soft tissue extensibility and allowing for proper ROM for normal function with self care, mobility, lifting and ambulation. Charges:  Timed Code Treatment Minutes: 25   Total Treatment Minutes: 50      [] EVAL (LOW) 56074 (typically 20 minutes face-to-face)  [] EVAL (MOD) 52154 (typically 30 minutes face-to-face)  [] EVAL (HIGH) 75019 (typically 45 minutes face-to-face)  [] RE-EVAL     [] NR(10555) x     [] Dry needle 1 or 2 Muscles (45031)  [] NMR (70291) x     [] Dry needle 3+ Muscles (51059)  [] Manual (11780) x     [] Ultrasound (21632) x  [x] TA (28518) x     [] Mech Traction (14967)  [] ES(attended) (28505)     [] ES (un) (01899):   [] Vasopump (71944) [] Ionto (32225)   [x] Other: CRP    GOALS:  Patient stated goal: \"Get rid of my dizziness\"  [] Progressing: [] Met: [] Not Met: [] Adjusted     Therapist goals for Patient:  Short Term Goals: To be achieved in: 2 weeks  1. Independent in HEP and progression per patient tolerance, in order to prevent re-injury. [] Progressing: [] Met: [] Not Met: [] Adjusted  2. Patient will have a decrease in dizziness/imbalance/symptoms by 40% to facilitate improvement in movement, function, balance and ADLs as indicated by Functional Deficits. [] Progressing: [] Met: [] Not Met: [] Adjusted     Long Term Goals: To be achieved in: at discharge  1. Increase FOTO functional outcome score from 67 to 70  to assist with reaching prior level of function. [] Progressing: [] Met: [] Not Met: [] Adjusted  2. Patient will demonstrate negative oculomotor special testing/positional testing as indicated by patients Functional Deficits. [] Progressing: [] Met: [] Not Met: [] Adjusted  3. Patient will return to functional activities including being able to lay down in bed without increased symptoms or restriction. [] Progressing: [] Met: [] Not Met: [] Adjusted  4. Pt will be able to roll over in bed without increased symptoms or restriction.   [] Progressing: [] Met: [] Not Met: [] Adjusted         ASSESSMENT:  See eval    Treatment/Activity Tolerance:  [x] Patient tolerated treatment well [] Patient limited by fatique  [] Patient limited by pain  [] Patient limited by other medical complications  [] Other:     Overall Progression Towards Functional goals/ Treatment Progress Update:  [] Patient is progressing as expected towards functional goals listed. [] Progression is slowed due to complexities/Impairments listed. [] Progression has been slowed due to co-morbidities. [x] Plan just implemented, too soon to assess goals progression <30days   [] Goals require adjustment due to lack of progress  [] Patient is not progressing as expected and requires additional follow up with physician  [] Other    Prognosis for POC: [x] Good [] Fair  [] Poor    Patient requires continued skilled intervention: [x] Yes  [] No        PLAN: Reassess positional testing; add balance retraining as needed  [] Continue per plan of care [] Alter current plan (see comments)  [x] Plan of care initiated [] Hold pending MD visit [] Discharge    Electronically signed by: Estefanía Burroughs, PT , OMT-C,  [id number]      Note: If patient does not return for scheduled/recommended follow up visits, this note will serve as a discharge from care along with the most recent update on progress.

## 2022-08-04 ENCOUNTER — HOSPITAL ENCOUNTER (OUTPATIENT)
Dept: PHYSICAL THERAPY | Age: 67
Setting detail: THERAPIES SERIES
Discharge: HOME | End: 2022-08-04
Payer: MEDICARE

## 2022-08-04 PROCEDURE — 97530 THERAPEUTIC ACTIVITIES: CPT

## 2022-08-04 NOTE — FLOWSHEET NOTE
[facility] [address]  Phone: [phone]   Fax:     [phone]    Physical Therapy Treatment Note/ Progress Report:   Date:  2022    Patient Name:  Chapin Jones    :  1955  MRN: [id number]    Pertinent Medical History: HTN, melanoma, OA, COVID, depression, R knee scope, hyperlipidemia, GERD, urinary incontinence- wears depends, wears glasses. Medical/Treatment Diagnosis Information:  Diagnosis: H81.10,  BPPV, unspecified  Treatment Diagnosis: Vestibular Impairment, BPPV    Insurance/Certification information:  PT Insurance Information: Medicare  Physician Information:  Referring Provider (secondary): Domingo Greer MD  Plan of care signed (Y/N): [x]  Yes was faxed signed and returned. Signed POC is within media tab under Physical therapy certification     Date of Patient follow up with Physician: Not known     Progress Report: []  Yes  [x]  No     Date Range for reporting period:  Beginnin2022  Ending:     Progress report due (10 Rx/or 30 days whichever is less):  7912    Recertification due (POC duration/ or 90 days whichever is less):      Visit # Insurance Allowable Auth required?  [place]n  KX modifier at visit 15 []  Yes [x]  No     Latex Allergy:  [x]NO      []YES  Preferred Language for Healthcare:   [x]English       []other:    Functional Scale:      Date assessed: at eval  Test: FOTO - vestibular  Score: 67    Dizziness level:  IE admits that the eval questionnaire asked about pain 3/10, but feels dizziness was 6/10.   2022 0/10    History of Injury:Pt with history of vertigo in the past which was treated with epley maneuver. Pt states vertigo started 6-7 weeks ago. Pt states she rolled over in bed and got a spinning sensation. Pt states this lasts for just a few seconds.   Pt notes when she lays down flat in bed she gets dizzy and when she rolls onto her right side. If she moves any other direction she is fine. Pt denies any balance issues with walking. SUBJECTIVE: denies dizziness since evaluation.      OBJECTIVE:   Evaluation findings: 8/1/2022  Oculomotor/Vestibular Examination:  Spontaneous nystagmus:       [] Left             [] Right           [x] Absent  Gaze-Evoked nystagmus with fixation present:              Primary            [] Present       [x] Absent              Right                [] Present       [x] Absent              Left                  [] Present       [x] Absent  VOR Head Thrust Test:          [] Normal       [x] Abnormal    Comments: (+) to the right  VOR Cancellation:                  [] Normal       [] Abnormal    Comments:  Smooth Pursuit:                      [x] Normal       [] Abnormal    Comments:  Saccades:                               [x] Normal       [] Abnormal    Comments:  Convergence:                          [x] Normal       [] Abnormal    Comments:     Positional Testing  R Hallpike-Jethro maneuver:              Nystagmus:     [x] Yes             [] No               [x] Duration:    10 secs                                      [x] Direction:    Upbeating to the right              Vertigo:            [x] Yes             [] No               [x] Duration: 10 secs  L Hallpike-Jethro maneuver:              Nystagmus:     [] Yes             [x] No               [] Duration:                                          [] Direction:                  Vertigo:            [x] Yes             [] No               [] Duration: 3 secs  Supine roll head right:              Nystagmus:     [] Yes             [x] No               [] Duration:                                          [] Direction:                  Vertigo:            [] Yes             [x] No               [] Duration:   Supine roll head left:              Nystagmus:     [] Yes             [x] No               [] Duration:                                          [] [] Yes             [x] No               [] Duration:  Balance  Static standing eyes open feet together normal sway, eyes closed normal sway, eyes closed head turns mild inc sway normal sway. RESTRICTIONS/PRECAUTIONS:     Exercises/Interventions:     Therapeutic Exercises (36083) Min: Resistance/Reps Notes/Cues                                                Therapeutic Activities (43584) Min: 36     Pt education BPPV pathology, role of PT,  and the effects on daily activities. Rationale for oculomotor testing, head thrust and what findings mean if positive and negative. + head thrust being consistent with Vestibular hypofunction and review exs VOR. Gave handout on epley. Differential dx of dizziness, denied denied neuro disorders, TBI/concussion hx, denies falls    Reassessment of special tests above     Reasessment balance               Neuromuscular Re-ed (73948) Min:                                    Manual Intervention (92164) Min:                Modalities  Min:                      Other Therapeutic Activities: Pt was educated on PT POC, Diagnosis, Prognosis, pathomechanics as well as frequency and duration of scheduling future physical therapy appointments. Time was also taken on this day to answer all patient questions and participation in PT. Reviewed appointment policy in detail with patient and patient verbalized understanding. Home Exercise Program:     Therapeutic Exercise and NMR EXR  [] (57213) Provided verbal/tactile cueing for activities related to strengthening, flexibility, endurance, ROM for improvements in LE, proximal hip, and core control with self care, mobility, lifting, ambulation. [x] (49688) Provided verbal/tactile cueing for activities related to improving balance, coordination, kinesthetic sense, posture, motor skill, proprioception  to assist with LE, proximal hip, and core control in self care, mobility, lifting, ambulation and eccentric single leg control.     NMR and Therapeutic Activities:    [x] (54411 or 55081) Provided verbal/tactile cueing for activities related to improving balance, coordination, kinesthetic sense, posture, motor skill, proprioception and motor activation to allow for proper function of core, proximal hip and LE with self care and ADLs and functional mobility.   [] (02636) Gait Re-education- Provided training and instruction to the patient for proper LE, core and proximal hip recruitment and positioning and eccentric body weight control with ambulation re-education including up and down stairs     Home Exercise Program:    [] (90630) Reviewed/Progressed HEP activities related to strengthening, flexibility, endurance, ROM of core, proximal hip and LE for functional self-care, mobility, lifting and ambulation/stair navigation   [x] (28755)Reviewed/Progressed HEP activities related to improving balance, coordination, kinesthetic sense, posture, motor skill, proprioception of core, proximal hip and LE for self care, mobility, lifting, and ambulation/stair navigation      Manual Treatments:  PROM / STM / Oscillations-Mobs:  G-I, II, III, IV (PA's, Inf., Post.)  [] (66577) Provided manual therapy to mobilize LE, proximal hip and/or LS spine soft tissue/joints for the purpose of modulating pain, promoting relaxation,  increasing ROM, reducing/eliminating soft tissue swelling/inflammation/restriction, improving soft tissue extensibility and allowing for proper ROM for normal function with self care, mobility, lifting and ambulation.      Charges:  Timed Code Treatment Minutes: 40 TA   Total Treatment Minutes: 55      [] EVAL (LOW) 53803 (typically 20 minutes face-to-face)  [] EVAL (MOD) 31532 (typically 30 minutes face-to-face)  [] EVAL (HIGH) 56219 (typically 45 minutes face-to-face)  [] RE-EVAL     [] ZK(42103) x     [] Dry needle 1 or 2 Muscles (69514)  [] NMR (66136) x     [] Dry needle 3+ Muscles (46076)  [] Manual (40295) x     [] Ultrasound (66030) x  [x] TA (05972) x   3  [] [facility] Traction (42608)  [] ES(attended) (15927)     [] ES (un) (35982):   [] Vasopump (11601) [] Ionto (34041)   [] Other: CRP    GOALS:  Patient stated goal: \"Get rid of my dizziness\"  [] Progressing: [] Met: [] Not Met: [] Adjusted     Therapist goals for Patient:  Short Term Goals: To be achieved in: 2 weeks  1. Independent in HEP and progression per patient tolerance, in order to prevent re-injury. [] Progressing: [x] Met: [] Not Met: [] Adjusted  2. Patient will have a decrease in dizziness/imbalance/symptoms by 40% to facilitate improvement in movement, function, balance and ADLs as indicated by Functional Deficits. [] Progressing: [x] Met: [] Not Met: [] Adjusted     Long Term Goals: To be achieved in: at discharge  1. Increase FOTO functional outcome score from 67 to 70  to assist with reaching prior level of function. [x] Progressing: [] Met: [x] Not Met: 79 Pt states prior to PT had spinning in bed when rolling, is 100% better now [] Adjusted  2. Patient will demonstrate negative oculomotor special testing/positional testing as indicated by patients Functional Deficits. [] Progressing: [x] Met: [] Not Met: [] Adjusted  3. Patient will return to functional activities including being able to lay down in bed without increased symptoms or restriction. [] Progressing: [x] Met: [] Not Met: [] Adjusted  4. Pt will be able to roll over in bed without increased symptoms or restriction. [] Progressing: [x] Met: [] Not Met: [] Adjusted         ASSESSMENT:  Pt reports 100% better, pt goals met, satisfied with care and progress. Discussed keeping Mondays session to see how she feels over weekend. Pt to call Monday if not symptomatic and cancel, this will then serve as d/c if does not return.    Treatment/Activity Tolerance:  [x] Patient tolerated treatment well [] Patient limited by fatique  [] Patient limited by pain  [] Patient limited by other medical complications  [] Other:     Overall Progression Towards Functional goals/ Treatment Progress Update:  [] Patient is progressing as expected towards functional goals listed. [] Progression is slowed due to complexities/Impairments listed. [] Progression has been slowed due to co-morbidities. [x] Plan just implemented, too soon to assess goals progression <30days   [] Goals require adjustment due to lack of progress  [] Patient is not progressing as expected and requires additional follow up with physician  [] Other    Prognosis for POC: [x] Good [] Fair  [] Poor    Patient requires continued skilled intervention: [x] Yes  [] No        PLAN: Reassess positional testing; add balance retraining as needed  [] Continue per plan of care [] Alter current plan (see comments)  [x] Plan of care initiated [] Hold pending MD visit [] Discharge    Electronically signed by: Nicki Dixon PT  DPT      Note: If patient does not return for scheduled/recommended follow up visits, this note will serve as a discharge from care along with the most recent update on progress.

## 2022-08-09 ENCOUNTER — HOSPITAL ENCOUNTER (OUTPATIENT)
Dept: PHYSICAL THERAPY | Age: 67
Setting detail: THERAPIES SERIES
End: 2022-08-09
Payer: MEDICARE

## 2022-09-30 ENCOUNTER — HOSPITAL ENCOUNTER (OUTPATIENT)
Dept: WOMENS IMAGING | Age: 67
Discharge: HOME | End: 2022-09-30
Payer: MEDICARE

## 2022-09-30 DIAGNOSIS — Z12.31 VISIT FOR SCREENING MAMMOGRAM: ICD-10-CM

## 2022-09-30 PROCEDURE — 77063 BREAST TOMOSYNTHESIS BI: CPT

## 2022-10-19 NOTE — PROGRESS NOTES
Received from PACU. Admitted to Phase 2 care. Awake and alert, respirations easy and even. Oriented to room and surroundings. No complaints. 19-Oct-2022 10:11

## 2022-11-07 ENCOUNTER — TRANSCRIBE ORDERS (OUTPATIENT)
Dept: ADMINISTRATIVE | Age: 67
End: 2022-11-07

## 2022-11-07 DIAGNOSIS — M79.89 SWELLING OF LIMB: Primary | ICD-10-CM

## 2022-11-08 ENCOUNTER — HOSPITAL ENCOUNTER (OUTPATIENT)
Dept: ULTRASOUND IMAGING | Age: 67
Discharge: HOME | End: 2022-11-08
Payer: MEDICARE

## 2022-11-08 DIAGNOSIS — M79.89 SWELLING OF LIMB: ICD-10-CM

## 2022-11-08 PROCEDURE — 76705 ECHO EXAM OF ABDOMEN: CPT

## 2022-11-15 ENCOUNTER — INITIAL CONSULT (OUTPATIENT)
Dept: SURGERY | Age: 67
End: 2022-11-15
Payer: MEDICARE

## 2022-11-15 VITALS
SYSTOLIC BLOOD PRESSURE: 126 MMHG | BODY MASS INDEX: 30.3 KG/M2 | HEIGHT: 63 IN | HEART RATE: 60 BPM | DIASTOLIC BLOOD PRESSURE: 70 MMHG

## 2022-11-15 DIAGNOSIS — D17.23 LIPOMA OF RIGHT LOWER EXTREMITY: Primary | ICD-10-CM

## 2022-11-15 PROCEDURE — 3017F COLORECTAL CA SCREEN DOC REV: CPT | Performed by: SURGERY

## 2022-11-15 PROCEDURE — G8399 PT W/DXA RESULTS DOCUMENT: HCPCS | Performed by: SURGERY

## 2022-11-15 PROCEDURE — 99203 OFFICE O/P NEW LOW 30 MIN: CPT | Performed by: SURGERY

## 2022-11-15 PROCEDURE — G8427 DOCREV CUR MEDS BY ELIG CLIN: HCPCS | Performed by: SURGERY

## 2022-11-15 PROCEDURE — 1090F PRES/ABSN URINE INCON ASSESS: CPT | Performed by: SURGERY

## 2022-11-15 PROCEDURE — 1123F ACP DISCUSS/DSCN MKR DOCD: CPT | Performed by: SURGERY

## 2022-11-15 PROCEDURE — 1036F TOBACCO NON-USER: CPT | Performed by: SURGERY

## 2022-11-15 PROCEDURE — G8417 CALC BMI ABV UP PARAM F/U: HCPCS | Performed by: SURGERY

## 2022-11-15 PROCEDURE — G8484 FLU IMMUNIZE NO ADMIN: HCPCS | Performed by: SURGERY

## 2022-11-16 NOTE — PROGRESS NOTES
New Patient [address] Surgery Jorge Nieves, [address] [facility], [address] [facility], [facility]  Phone: [phone]  Fax: [phone] [facility]   YOB: 1955    Date of Visit:  11/15/2022    No ref. provider found  Mazie Denver    HPI:    Patient complains of a mass of the right lateral  thigh. The mass has been present for a few weeks. The mass has not changed in a few weeks. Symptoms associated  are: tendency to be traumatized, unsightliness. Patient denies increasing diameter, drainage, infection.     Allergies   Allergen Reactions    Codeine Nausea And Vomiting     N/V x`1 but \"can tolerate since\"     Outpatient Medications Marked as Taking for the 11/15/22 encounter (Initial consult) with Brenton Melton MD   Medication Sig Dispense Refill    rosuvastatin (CRESTOR) 5 MG tablet Take 5 mg by mouth at bedtime      vitamin B-12 (CYANOCOBALAMIN) 1000 MCG tablet Take 1,000 mcg by mouth every evening      Cholecalciferol (VITAMIN D3) 125 MCG (5000 UT) TABS Take 1 tablet by mouth every evening      ibuprofen (ADVIL;MOTRIN) 600 MG tablet Take 600 mg by mouth at bedtime      omeprazole 20 MG EC tablet Take 20 mg by mouth daily Take DOS with sip of water      carvedilol (COREG) 12.5 MG tablet 2 times daily (with meals)       clobetasol (TEMOVATE) 0.05 % ointment Apply topically three times a week Apply topically-MON,WED,FRI      citalopram (CELEXA) 20 MG tablet Take 40 mg by mouth daily          Past Medical History:   Diagnosis Date    Acid reflux     Arthritis     Depression     High blood pressure     Hyperlipidemia     Melanoma (HCC)     BACK GRADE 0    Oral lichen planus     Prolonged emergence from general anesthesia     FAMILY HX-BROTHER-VERY LONG TIME TO WAKE UP AND PER PATIENT GETS A LITTLE GOOFY    Urinary incontinence     wears depends    Wears glasses      Past Surgical History:   Procedure Laterality Date    CARPAL TUNNEL RELEASE  2003    bilateral    COLONOSCOPY      DILATION AND CURETTAGE OF UTERUS N/A 2/8/2022    HYSTEROSCOPY DILATION AND CURETTAGE performed by Morgan Escalante MD at [address] Right 2009    middle    FINGER TRIGGER RELEASE Right 10/29/2015    with Excision of retinacular ganglion cyst flexor tendon sheath-ring finger    FINGER TRIGGER RELEASE Left 5/2/2019    LEFT RING FINGER TRIGGER FINGER RELEASE performed by Daniel Mosqueda MD at [address] ARTHROSCOPY Right     meniscus     Family History   Problem Relation Age of Onset    Cancer Mother     Pulmonary Embolism Father      Social History     Socioeconomic History    Marital status: Single     Spouse name: Not on file    Number of children: Not on file    Years of education: Not on file    Highest education level: Not on file   Occupational History    Not on file   Tobacco Use    Smoking status: Never    Smokeless tobacco: Never   Vaping Use    Vaping Use: Never used   Substance and Sexual Activity    Alcohol use: No    Drug use: Never    Sexual activity: Not on file   Other Topics Concern    Not on file   Social History Narrative    Not on file     Social Determinants of Health     Financial Resource Strain: Not on file   Food Insecurity: Not on file   Transportation Needs: Not on file   Physical Activity: Not on file   Stress: Not on file   Social Connections: Not on file   Intimate Partner Violence: Not on file   Housing Stability: Not on file          A review of the patient's record including allergies, medication list, tobacco history, family history, problem list, medical history and social history has been completed and updates made to the patient's EMR where indicated. Vitals:    11/15/22 1330   BP: 126/70   Site: Left Upper Arm   Position: Sitting   Cuff Size: Medium Adult   Pulse: 60   Height: 5' 2.99\" (1.6 m)     Body mass index is 30.3 kg/m².      Wt Readings from Last 3 Encounters:   02/08/22 171 lb (77.6 kg) 05/13/19 277 lb (125.6 kg)   05/02/19 277 lb 5.4 oz (125.8 kg)     BP Readings from Last 3 Encounters:   11/15/22 126/70   02/08/22 (!) 97/56   02/08/22 (!) 149/70          REVIEW OF SYSTEMS:   All other systems reviewed; please refer to HPI with pertinent positives, all other ROS are negative    PHYSICAL EXAM:    CONSTITUTIONAL:  awake, alert, no apparent distress and moderately obese  ENT:  normocepalic, without obvious abnormality  NECK:  supple, symmetrical, trachea midline   LUNGS:  Resp easy and unlabored  CARDIOVASCULAR:   ABDOMEN: soft, non-distended,   MUSCULOSKELETAL: No edema  NEUROLOGIC:  Mental Status Exam:  Level of Alertness:   awake  Orientation:   person, place, time    A mass of size 2x2 cm is felt in the lateral mid/distal right thigh, smooth, mobile, non-tender. DATA:      ASSESSMENT:     Diagnosis Orders   1. Lipoma of right lower extremity          Mass is consistent with a lipoma on my exam    PLAN:    We will schedule the pt for right thigh lipoma excision under local anesthesia. The technical aspects, risks, benefits and complications  (bleeding, infection, recurrent lipoma) of the procedure were discussed with the patient. The pt appears to understand, asks appropriate questions, and agrees to proceed with the procedure.        Elias Sotelo MD

## 2022-11-16 NOTE — PATIENT INSTRUCTIONS
[address] [address]  Phone: [phone]  Fax: [phone] will be scheduled for surgery with Dr. Iveth Malave. The office will call you with the date and time that surgery is scheduled. Please take note of these instructions for surgery: You should have nothing by mouth after midnight the night before your surgery - this includes no food or water. Your surgery will be cancelled if you have taken anything by mouth after midnight, NO exceptions. You will need to have a history and physical prior to your surgery. This will need to be completed up to 30 days before your surgery. This H/P can be completed by your family doctor or the hospital.   IF you take coumadin (warfarin), please stop taking this medication 5 days prior to your surgery. IF you take plavix, please stop taking this 7 days prior to your surgery. Please contact our office if you have a pacemaker or defibrillator. IF you are allergic to latex, please tell our office prior to your surgery. This is important in know before scheduling your surgery. IF you are having an out patient surgery, you will need someone available to drive you home after your surgery, and to also stay with you for the rest of the day. IF you are having a surgery requiring an inpatient stay in the hospital, you will need someone to drive you home upon discharge from the hospital.  Please contact Dr. Herman Gibson assistant Sandra Jasso if you have any questions or concerns. Please call the office with any changes in your symptoms or further questions/concerns.  [phone]

## 2022-11-17 ENCOUNTER — TELEPHONE (OUTPATIENT)
Dept: SURGERY | Age: 67
End: 2022-11-17

## 2022-11-17 NOTE — TELEPHONE ENCOUNTER
Pt saw Dr Brianna Minor in the office 11/15/22 and was given surgery instructions for a Right Thigh Lipoma Excision (Local Anes) scheduled 12/30/22 @ 7:30am arrival 6:30am [facility] - Pt understood and agreed w/ above noted

## 2022-12-21 NOTE — PROGRESS NOTES
Charlyne Elks    Age 77 y.o.    female    1955    LAVELL [id number]    12/30/2022  Arrival Time_____________  OR Time____________[address]     Procedure(s):  RIGHT THIGH LIPOMA EXCISION                      Local    Surgeon(s):  Allisonmor Conleya, MD       Phone [phone] ([place])     [address]  Cell         Work  _____________________________________________________________________  _____________________________________________________________________  _____________________________________________________________________  _____________________________________________________________________  _____________________________________________________________________    PCP _____________________________ Phone_________________     H&P__________________Bringing      Chart            Epic   DOS      Called________  EKG__________________Bringing      Chart            Epic   DOS      Called________  LAB__________________ Bringing      Chart            Epic   DOS      Called________  Cardiac Clearance_______Bringing      Chart            Epic      DOS      Called________    Cardiologist________________________ Phone___________________________    ? Ep[employer]op[employer] concerns / Waiver on Chart            PAT Communications________________  ? Pre-op Instructions Given [place]          _________________________________  ? Directions to Surgery Center                          _________________________________  ? Transportation Home_______________      __________________________________  ?  Crutches/Walker__________________        __________________________________    ________Pre-op Orders   _______Transcribed    _______Wt.  ________Pharmacy          _______SCD  ______VTE     ______TED Albertus Fontan  _______  Surgery Consent    _______  Anesthesia Consent         COVID DATE______________LOCATION________________ RESULT__________

## 2022-12-30 ENCOUNTER — HOSPITAL ENCOUNTER (OUTPATIENT)
Age: 67
Setting detail: OUTPATIENT SURGERY
Discharge: HOME | End: 2022-12-30
Attending: SURGERY | Admitting: SURGERY
Payer: MEDICARE

## 2022-12-30 VITALS
RESPIRATION RATE: 18 BRPM | HEIGHT: 63 IN | SYSTOLIC BLOOD PRESSURE: 118 MMHG | HEART RATE: 68 BPM | DIASTOLIC BLOOD PRESSURE: 55 MMHG | TEMPERATURE: 97 F | WEIGHT: 230 LBS | BODY MASS INDEX: 40.75 KG/M2 | OXYGEN SATURATION: 98 %

## 2022-12-30 DIAGNOSIS — D17.23 LIPOMA OF RIGHT LOWER EXTREMITY: ICD-10-CM

## 2022-12-30 PROCEDURE — 3600000013 HC SURGERY LEVEL 3 ADDTL 15MIN: Performed by: SURGERY

## 2022-12-30 PROCEDURE — 2580000003 HC RX 258: Performed by: SURGERY

## 2022-12-30 PROCEDURE — 3600000003 HC SURGERY LEVEL 3 BASE: Performed by: SURGERY

## 2022-12-30 PROCEDURE — A4217 STERILE WATER/SALINE, 500 ML: HCPCS | Performed by: SURGERY

## 2022-12-30 PROCEDURE — 2709999900 HC NON-CHARGEABLE SUPPLY: Performed by: SURGERY

## 2022-12-30 PROCEDURE — 2500000003 HC RX 250 WO HCPCS: Performed by: SURGERY

## 2022-12-30 PROCEDURE — 7100000010 HC PHASE II RECOVERY - FIRST 15 MIN: Performed by: SURGERY

## 2022-12-30 PROCEDURE — 88304 TISSUE EXAM BY PATHOLOGIST: CPT

## 2022-12-30 PROCEDURE — 7100000011 HC PHASE II RECOVERY - ADDTL 15 MIN: Performed by: SURGERY

## 2022-12-30 RX ORDER — MAGNESIUM HYDROXIDE 1200 MG/15ML
LIQUID ORAL CONTINUOUS PRN
Status: COMPLETED | OUTPATIENT
Start: 2022-12-30 | End: 2022-12-30

## 2022-12-30 ASSESSMENT — PAIN - FUNCTIONAL ASSESSMENT: PAIN_FUNCTIONAL_ASSESSMENT: 0-10

## 2022-12-30 ASSESSMENT — PAIN SCALES - GENERAL: PAINLEVEL_OUTOF10: 0

## 2022-12-30 NOTE — OP NOTE
Operative Note      Patient: Katt Vick  YOB: 1955  MRN: [id number]    Date of Procedure: 12/30/2022    Pre-Op Diagnosis: Lipoma of right lower extremity [D17.23]    Post-Op Diagnosis: Same       Procedure(s):  RIGHT THIGH LIPOMA EXCISION    Surgeon(s):  Masha Verdugo MD    Assistant:   Surgical Assistant: Arlene Banks    Anesthesia: Local    Estimated Blood Loss (mL): less than 50     Complications: None    Specimens:   ID Type Source Tests Collected by Time Destination   A : RIGHT THIGH LIPOMA Tissue Tissue SURGICAL PATHOLOGY Masha Verdugo MD 12/30/2022 2983        Implants:  * No implants in log *      Drains: * No LDAs found *    Findings: ~3cm soft subcutaneous lipoma, right thigh, excised    Detailed Description of Procedure:   After informed consent was obtained, the patient was taken to the operating room and placed in the left lateral decubitus position, with appropriate padding to all pressure points. The previously-marked lesion on the lateral right thigh was prepped and draped in a sterile fashion. Local anesthesia was infiltrated into the skin overlying the mass. A 2cm vertically-oriented incision was made sharply. The subcutaneous lipoma was immediately exposed. Minimal blunt and sharp dissection was needed to mobilize the lipoma up and out of the pocket. The ~3cm soft lipoma was fully excised. Hemostasis was excellent. The incision was closed with 3-0 Vicryl subcutaneous sutures followed by a 4-0 Monocryl running subcuticular suture. Steristrips and a sterile dressing were applied. Patient tolerated the procedure well and was taken to the recovery room in stable condition.     Electronically signed by Jeramy Ashley MD on 12/30/2022 at 8:08 AM

## 2022-12-30 NOTE — DISCHARGE INSTRUCTIONS
[facility] SURGERY [facility] AND [facility]. Mike Brush M.D. [address] [place] [address]                2055 Derrick Lopez M.D. [address]         [place], [address] [place] [address] Concetta Bernheim, M.D                         [phone]        [facility] Vane Ramirez M.D. [facility]       POST-OPERATIVE INSTRUCTIONS FOLLOWING EXCISION OF A MASS    Call the office to schedule your post-operative appointment with your surgeon for two (2) weeks. You will have white steri-strips and a water occlusive dressing closing your incisions. You may shower. Wash incisions gently, and pat them dry. Do not rub your incisions. If you have packing in your wound, this usually will require daily dressing changes. Follow the instructions given to you at the hospital.     Louisa Siegel will have pain medicine ordered. Take as directed. Do NOT drive for one week and while taking your narcotic pain medicine. Watch for signs of infection:       Fever over 100.5°     Excessive warmth, or bright redness around your incision    Leakage of bloody or cloudy fluid from you incisions      What is a Surgical Site Infection or  (SSI)? A surgical site infection (SSI) is an infection that occurs after surgery in the part of the body where the surgery took place. Most patients who have surgery do not develop an infection. However, infections can develop in about 1-3 cases for every 100 patients who have had surgery. Our goal is for you to NOT experience any complications and be completely satisfied with your care! However, some signs or symptoms to look for and report immediately to your doctor are:   1. Fever above 101 degrees    2.  Redness and increasing pain around the area where you had surgery   3. Drainage of cloudy fluid or pus coming from the surgical area    Some of the things we/ you can do to prevent SSI's are:   1. Clean hands with soap and water or an alcohol-based hand rub before and after caring for the operative area. This occurs the day of surgery and for the next 2 weeks. 2.Sometimes you receive an appropriate antibiotic within 60 minutes before your surgery or take one for several days after surgery depending on your surgeon's instructions and/or the type of surgery you are having. 3. Family and/or friends who visit you should NOT touch the surgical wound or dressings until advised by your surgeon. 4. Be sure to elevate and decrease the swelling after your surgery to help prevent infection. 5. If you are a diabetic, you need to closely monitor your blood sugar levels and report any significant increases or changes to your surgeon to help promote the healing process.

## 2022-12-30 NOTE — H&P
HISTORY & PHYSICAL FOR LOCAL CASES    Vitals:    12/30/22 0656   BP: (!) 137/54   Pulse: 71   Resp: 18   Temp: 97 °F (36.1 °C)   SpO2: 97%         History of present illness:  small right lateral thigh subcutaneous mass, c/w lipoma    All allergies & meds reviewed  RELEVANT EXAMS:  Airway:  normal    Pulmonary: normal    Cardiovascular: normal    Abdominal: normal    Specific to procedure: ~1.5x.1.5cm firm, mobile, non-tender subcutaneous mass, lateral mid-R thigh    I have reviewed with the patient and/or family the risks, benefits and alternatives to the procedure.   ASA Class:  1    The patient condition is acceptable for planned local anesthesia:

## 2023-06-20 ENCOUNTER — HOSPITAL ENCOUNTER (OUTPATIENT)
Age: 68
Discharge: HOME | End: 2023-06-20
Payer: MEDICARE

## 2023-06-20 ENCOUNTER — HOSPITAL ENCOUNTER (OUTPATIENT)
Dept: GENERAL RADIOLOGY | Age: 68
Discharge: HOME | End: 2023-06-20
Payer: MEDICARE

## 2023-06-20 PROCEDURE — 70100 X-RAY EXAM OF JAW <4VIEWS: CPT

## 2023-08-02 NOTE — PROGRESS NOTES
[address] [place] [address]  Phone: [phone]  Fax: [phone]                                                      Physical Therapy Certification    Dear   Constantino Wolf MD,    We had the pleasure of evaluating the following patient for physical therapy services at [facility]. [address]. A summary of our findings can be found in the initial assessment below. This includes our plan of care. If you have any questions or concerns regarding these findings, please do not hesitate to contact me at the office phone number checked above. Thank you for the referral.       Physician Signature:_______________________________Date:__________________  By signing above (or electronic signature), therapist's plan is approved by physician      Patient: Mona Kingsley   : 1955   MRN: [id number]  Referring Physician: Constantino Wolf MD      Evaluation Date: 2023      Medical Diagnosis Information:  Medical Diagnosis: Unspecified urinary incontinence [R32]                                             Insurance information: PT Insurance Information: Medicare $$$    Second person requested for examination:  [x] No    [] Yes   If yes, who was present:    Precautions/ Contra-indications: h/o pain with transvaginal pelvic exams    Latex Allergy:  [x]NO      []YES    Preferred Language for Healthcare:   [x]English       []Other:    C-SSRS Triggered by Intake questionnaire (Past 2 wk assessment ):   [x] No, Questionnaire did not trigger screening.   [] Yes, Patient intake triggered C-SSRS Screening     [] Completed, no further action required.    [] Completed, PCP notified via Epic    Functional Outcome:   PFDI-20: 72.91  Sub-sections: POPDI-6 Score 8.33; CRADI-8 Score: 6.25; SUZAN-6 Score: 58.33    Female Sexual Function Index (FSFI) NT/36 - reports severe pain with vaginal penetration for medical

## 2023-08-08 ENCOUNTER — HOSPITAL ENCOUNTER (OUTPATIENT)
Dept: PHYSICAL THERAPY | Age: 68
Setting detail: THERAPIES SERIES
Discharge: HOME | End: 2023-08-08
Payer: MEDICARE

## 2023-08-08 PROCEDURE — 97112 NEUROMUSCULAR REEDUCATION: CPT | Performed by: PHYSICAL THERAPIST

## 2023-08-08 PROCEDURE — 97110 THERAPEUTIC EXERCISES: CPT | Performed by: PHYSICAL THERAPIST

## 2023-08-08 PROCEDURE — 97162 PT EVAL MOD COMPLEX 30 MIN: CPT | Performed by: PHYSICAL THERAPIST

## 2023-08-08 PROCEDURE — 97530 THERAPEUTIC ACTIVITIES: CPT | Performed by: PHYSICAL THERAPIST

## 2023-08-08 PROCEDURE — 97140 MANUAL THERAPY 1/> REGIONS: CPT | Performed by: PHYSICAL THERAPIST

## 2023-08-10 NOTE — PROGRESS NOTES
[address] [place] [address]. [address], [place], [address] [facility]  Phone: [phone]  Fax: [phone]        Physical Therapy Daily Treatment Note    Date: 2023    Patient Name: Catherene Blizzard : 1955 MRN: [id number]    Restrictions/Precautions:    Medical/Treatment Diagnosis Information:    Medical Diagnosis:  Unspecified urinary incontinence [R32]       Insurance/Certification information: PT Insurance Information: Medicare $$$    Physician Information: Magaly Dorsey MD    Plan of care signed (Y/N): Y  Received via fax from Dr. Jessica Perez dated 2023    Visit# / total visits: 2/10 (estimated)       Medicare Cap (if applicable):    *** = total amount used, updated 2023    Time in: ***  Time Out: ***  Total Treatment Time: *** minutes    Charges: *** Therapeutic Activity (37404)    Therapeutic Exercise (73149)    Manual (59684)    Neuromuscular Re-ed (74029)        ________________________________________________________________________________________    Pain Level: /10    SUBJECTIVE: ***    OBJECTIVE:      Treatment Interventions Implemented     Exercise/Neuromuscular Re-education - Written HEP instructions provided and reviewed     Diaphragmatic breathing - coordinating with pelvic floor muscle activities - tactile cues on stomach - cues for PF descent with inhalation - cues to avoid voluntary contraction of PF muscles and focus on natural descent/gentle stretch with inhalation - cues for 360* breathing         PF muscle control exercises -  supine - transvaginal feedback - QUICK FLICKS - 10 reps with cues to use for urge suppression      Manual Interventions -   Patient verbally consented to transvaginal PF muscle assessment and intervention.       Superficial external myofascial release and massage to transverse perineal and external anal sphincter muscles -    External clock - moderate tightness and tenderness in transverse perineal, L>R

## 2023-08-29 ENCOUNTER — HOSPITAL ENCOUNTER (OUTPATIENT)
Dept: PHYSICAL THERAPY | Age: 68
Setting detail: THERAPIES SERIES
Discharge: HOME | End: 2023-08-29
Payer: MEDICARE

## 2023-08-29 NOTE — PROGRESS NOTES
Physical Therapy  Women's Health - Pelvic Floor    Cancellation/No-show Note  Patient Name:  Collette Whittington  :  1955   Date:  2023  Cancelled visits to date: 1- 2023  No-shows to date: 0    For today's appointment patient:  [x]  Cancelled  []  Rescheduled appointment  []  No-show     Reason given by patient:  [x]  Patient ill  []  Conflicting appointment  []  No transportation    []  Conflict with work  []  No reason given  [x]  Other:  Left message   Comments: Will need to call to reschedule next follow up appointment if she wishes to continue with PF PT.      Electronically signed by:  Johnny Mcneal, PT #[id number]

## 2023-09-12 NOTE — PROGRESS NOTES
including bending and lifting for house hold chores without increased symptoms or restriction. [] Progressing: [] Met: [] Not Met: [] Adjusted  4. Patient will report increased confidence in her ability to control urine with little to no urinary leakage during both day and night. [] Progressing: [] Met: [] Not Met: [] Adjusted       5. Report using 3-4 behavioral modification strategies to reduce urinary/bowel complaints through dietary and mechanical changes, with focus on full emptying of bladder with each urination and using optimal positioning and deep breathing for relaxation of posterior PF muscles during defacation, reducing need to strain. [] Progressing: [] Met: [] Not Met: [] Adjusted       6. Rate severity of the problem related to urinary frequency/urgency/incontinence to 3-4 or less on scale of 0-10 with 0 being no problem and 10 being the worst - [facility] [] Progressing: [] Met: [] Not Met: [] Adjusted       7. Perform HEP for pelvic floor and core muscle groups independently as she progresses to self-manage her pelvic pressure/pain and PF and surrounding core muscle weakness and/or tightness. [] Progressing: [] Met: [] Not Met: [] Adjusted                   Plan:   Continue per plan of care     May add lateral thigh/piriformis and hip flexor stretches, possibly happy baby as alternative to inverted yoga routine. Reassess and address PF muscle tightness with external and transvaginal manual interventions as patient consents. Frequency/Duration:     Recommend see patient every ~2 weeks to perform tranvaginal PF muscle assessment and intervention as deemed necessary and to ensure patient is performing exercises for pelvic floor, hip and core stability correctly. Taper as appropriate, determining frequency of treatments based on progress. Patient's next scheduled appointment is on 10/31 at [address], due to schedule conflicts.     Electronically signed by Florencio Garvin,

## 2023-10-09 ENCOUNTER — HOSPITAL ENCOUNTER (OUTPATIENT)
Dept: WOMENS IMAGING | Age: 68
Discharge: HOME | End: 2023-10-09
Payer: MEDICARE

## 2023-10-09 VITALS — WEIGHT: 229 LBS | HEIGHT: 63 IN | BODY MASS INDEX: 40.57 KG/M2

## 2023-10-09 DIAGNOSIS — Z12.31 BREAST CANCER SCREENING BY MAMMOGRAM: ICD-10-CM

## 2023-10-09 PROCEDURE — 77063 BREAST TOMOSYNTHESIS BI: CPT

## 2023-10-11 ENCOUNTER — HOSPITAL ENCOUNTER (OUTPATIENT)
Dept: PHYSICAL THERAPY | Age: 68
Setting detail: THERAPIES SERIES
Discharge: HOME | End: 2023-10-11
Payer: MEDICARE

## 2023-10-11 PROCEDURE — 97110 THERAPEUTIC EXERCISES: CPT | Performed by: PHYSICAL THERAPIST

## 2023-10-11 PROCEDURE — 97112 NEUROMUSCULAR REEDUCATION: CPT | Performed by: PHYSICAL THERAPIST

## 2023-10-11 PROCEDURE — 97530 THERAPEUTIC ACTIVITIES: CPT | Performed by: PHYSICAL THERAPIST

## 2023-10-18 NOTE — PROGRESS NOTES
[address] [facility], [address] [place] [address]  Phone: [phone]  Fax: [phone]        Physical Therapy Daily Treatment Note    Date: 10/31/2023    Patient Name: Beverly Arteaga : 1955 MRN: [id number]    Restrictions/Precautions:    Medical/Treatment Diagnosis Information:    Medical Diagnosis:  Unspecified urinary incontinence [R32]       Insurance/Certification information: PT Insurance Information: Medicare $$$    Physician Information: Sindi Quintana MD    Plan of care signed (Y/N): Y  Received via fax from Dr. Hemant Caruso dated 2023 and 2023    Visit# / total visits: 3/10 (estimated)       Medicare Cap (if applicable):    $737.21 = total amount used, updated 10/31/2023    Time in: 800  Time Out: 900  Total Treatment Time: 60 minutes    Charges: Therapeutic Activity (13893)x2    Therapeutic Exercise (00206)x1    Manual (99788)    Neuromuscular Re-ed/Facilitation (90901)x1        ________________________________________________________________________________________    Pain Level: denies    SUBJECTIVE:     Reports now has another cold. On prednisone which has increased urinary frequency. More aware of trying to more fully empty bladder with rocking. Leaking more that now taking prednisone and wearing about 6-7 pads/day (compared to about 4 previously), leaking and unaware. Leaking and saturating through leak proof underwear with pads. Patient reports difficulty and back pain with getting up/down for floor for inverted yoga routine and asking for alternatives. Patient reports that she is still waiting to see if she qualifies for the tibial nerve stimulation study. Patient reports has tried topical estrogen about 1.5 years ago and reports some vaginal bleeding, so stopped. Still awaiting to see if she qualifies for the study.          OBJECTIVE:      Treatment Interventions Implemented     Exercise/Neuromuscular

## 2023-10-31 ENCOUNTER — HOSPITAL ENCOUNTER (OUTPATIENT)
Dept: PHYSICAL THERAPY | Age: 68
Setting detail: THERAPIES SERIES
Discharge: HOME | End: 2023-10-31
Payer: MEDICARE

## 2023-10-31 PROCEDURE — 97110 THERAPEUTIC EXERCISES: CPT | Performed by: PHYSICAL THERAPIST

## 2023-10-31 PROCEDURE — 97112 NEUROMUSCULAR REEDUCATION: CPT | Performed by: PHYSICAL THERAPIST

## 2023-10-31 PROCEDURE — 97530 THERAPEUTIC ACTIVITIES: CPT | Performed by: PHYSICAL THERAPIST

## 2023-11-01 NOTE — PROGRESS NOTES
[address] [place] [address]  Phone: [phone]  Fax: [phone]        Physical Therapy Daily Treatment Note    Date: 2023    Patient Name: Lynsey Nunn : 1955 MRN: [id number]    Restrictions/Precautions:    Medical/Treatment Diagnosis Information:    Medical Diagnosis:  Unspecified urinary incontinence [R32]       Insurance/Certification information: PT Insurance Information: Medicare $$$    Physician Information: Elli Arteaga MD    Plan of care signed (Y/N): Y  Received via fax from Dr. Tito Bell dated 2023 and 2023    Visit# / total visits: 4/10 (estimated)       Medicare Cap (if applicable):    $ 021.64  = total amount used, updated 2023    Time in: 1300  Time Out: 1400  Total Treatment Time: 60 minutes    Charges: Therapeutic Activity (36401)x2    Therapeutic Exercise (20161)x1    Manual (34245)    Neuromuscular Re-ed/Facilitation (96752)x1        ________________________________________________________________________________________    Pain Level: denies    SUBJECTIVE:     Patient is sore after spending  9 days in [facility] and it's cold outside. Reports pain in knees and seeing ortho about that soon - thinks tore meniscus. Reports has not been consistent with performing her stretches. Remains against to addressing pain and tightness in PF with manual interventions. Had another cold and was again put on prednisone prior to going to [facility]. Reports willing to learn different exercises, but no sure she wants to continue. Patient reports that leaking more in last two days since she has been home - wearing 6-7 pads for urinary leakage. Worse with cold weather. Not much urinary leakage when in [facility] but was not drinking much and not staying hydration. Reports at best since being PF PT wearing as little as 4 pads/day.  More aware of trying to more fully empty bladder with

## 2023-11-22 ENCOUNTER — HOSPITAL ENCOUNTER (OUTPATIENT)
Dept: PHYSICAL THERAPY | Age: 68
Setting detail: THERAPIES SERIES
Discharge: HOME | End: 2023-11-22
Payer: MEDICARE

## 2023-11-22 PROCEDURE — 97530 THERAPEUTIC ACTIVITIES: CPT | Performed by: PHYSICAL THERAPIST

## 2023-11-22 PROCEDURE — 97112 NEUROMUSCULAR REEDUCATION: CPT | Performed by: PHYSICAL THERAPIST

## 2023-11-22 PROCEDURE — 97110 THERAPEUTIC EXERCISES: CPT | Performed by: PHYSICAL THERAPIST

## 2024-12-03 ENCOUNTER — HOSPITAL ENCOUNTER (OUTPATIENT)
Dept: WOMENS IMAGING | Age: 69
Discharge: HOME | End: 2024-12-03
Payer: MEDICARE

## 2024-12-03 VITALS — BODY MASS INDEX: 40.57 KG/M2 | WEIGHT: 229 LBS | HEIGHT: 63 IN

## 2024-12-03 DIAGNOSIS — Z12.31 BREAST CANCER SCREENING BY MAMMOGRAM: ICD-10-CM

## 2024-12-03 PROCEDURE — 77063 BREAST TOMOSYNTHESIS BI: CPT

## (undated) DEVICE — GOWN,SIRUS,POLYRNF,BRTHSLV,XL,30/CS: Brand: MEDLINE

## (undated) DEVICE — DRESSING PETRO W3XL3IN OIL EMUL N ADH GZ KNIT IMPREG CELOS

## (undated) DEVICE — SOLUTION IV IRRIG 250ML ST LF 0.9% SODIUM 2F7122

## (undated) DEVICE — MINOR SET UP PK

## (undated) DEVICE — DRAPE LAP 104X35X124IN BLU CTRL + FAB REINF ABD FEN

## (undated) DEVICE — SOLUTION IV 1000ML 0.9% SOD CHL PH 5 INJ USP VIAFLX PLAS

## (undated) DEVICE — Z DISCONTINUED USE 2275676 GLOVE SURG SZ 65 L12IN FNGR THK87MIL DK GRN LTX FREE ISOLEX

## (undated) DEVICE — CYSTO/BLADDER IRRIGATION SET, REGULATING CLAMP

## (undated) DEVICE — SOLUTION IV IRRIG POUR BRL 0.9% SODIUM CHL 2F7124

## (undated) DEVICE — SHEET,DRAPE,53X77,STERILE: Brand: MEDLINE

## (undated) DEVICE — NEEDLE HYPO 18GA L1.5IN THN WALL PIVOTING SHLD BVL ORIENTED

## (undated) DEVICE — GLOVE SURG SZ 75 L12IN FNGR THK94MIL STD WHT LTX FREE

## (undated) DEVICE — ELECTRODE,RADIOTRANSLUCENT,FOAM,5PK: Brand: MEDLINE

## (undated) DEVICE — BANDAGE COMPR W2INXL5YD TAN BRTH SELF ADH WRP W/ HND TEAR

## (undated) DEVICE — SOLUTION IV IRRIG 500ML 0.9% SODIUM CHL 2F7123

## (undated) DEVICE — PENCIL SMK EVAC ALL IN 1 DSGN ENH VISIBILITY IMPROVED AIR

## (undated) DEVICE — GOWN SIRUS NONREIN XL W/TWL: Brand: MEDLINE INDUSTRIES, INC.

## (undated) DEVICE — BANDAGE COMPR W4INXL12FT E DISP ESMARCH EVEN

## (undated) DEVICE — SUTURE CHROMIC GUT SZ 4-0 L27IN ABSRB BRN FS-2 L19MM 3/8 635H

## (undated) DEVICE — ADHESIVE SKIN CLOSURE TOP 36 CC HI VISC DERMBND MINI

## (undated) DEVICE — GAUZE,SPONGE,4"X4",16PLY,STRL,LF,10/TRAY: Brand: MEDLINE

## (undated) DEVICE — UNDERGLOVE SURG SZ 8 FNGR THK0.21MIL GRN LTX BEAD CUF

## (undated) DEVICE — MINOR LITHOTOMY: Brand: MEDLINE INDUSTRIES, INC.

## (undated) DEVICE — MINOR SET UP PACK: Brand: MEDLINE INDUSTRIES, INC.

## (undated) DEVICE — SUTURE VCRL + SZ 3-0 L27IN ABSRB UD L26MM SH 1/2 CIR VCP416H

## (undated) DEVICE — CHLORAPREP 26ML ORANGE

## (undated) DEVICE — DRAPE HND W114XL142IN BLU POLYPR W O PCH FEN CRD AND TB HLDR

## (undated) DEVICE — SYRINGE MED 10ML LUERLOCK TIP W/O SFTY DISP

## (undated) DEVICE — ELECTRODE PT RET AD L9FT HI MOIST COND ADH HYDRGEL CORDED

## (undated) DEVICE — SURGICAL PROCEDURE PACK IV U-BAR

## (undated) DEVICE — SUTURE MCRYL + SZ 4-0 L18IN ABSRB UD L19MM PS-2 3/8 CIR MCP496G

## (undated) DEVICE — TUBING SUCT 10FR MAL ALUM SHFT FN CAP VENT UNIV CONN W/ OBT

## (undated) DEVICE — COVER LT HNDL BLU PLAS

## (undated) DEVICE — 3M™ TEGADERM™ TRANSPARENT FILM DRESSING FRAME STYLE, 1626W, 4 IN X 4-3/4 IN (10 CM X 12 CM), 50/CT 4CT/CASE: Brand: 3M™ TEGADERM™

## (undated) DEVICE — TUBING, SUCTION, 3/16" X 12', STRAIGHT: Brand: MEDLINE

## (undated) DEVICE — SMARTGOWN BREATHABLE SURGICAL GOWN: Brand: CONVERTORS

## (undated) DEVICE — NEEDLE HYPO 25GA L1.5IN BVL ORIENTED ECLIPSE

## (undated) DEVICE — SPONGE GZ W4XL4IN COT 12 PLY TYP VII WVN C FLD DSGN

## (undated) DEVICE — ZIMMER® STERILE DISPOSABLE TOURNIQUET CUFF WITH PLC, DUAL PORT, SINGLE BLADDER, 18 IN. (46 CM)

## (undated) DEVICE — GLOVE SURG SZ 8 L12IN FNGR THK94MIL STD WHT LTX SYN POLYMER

## (undated) DEVICE — APPLICATOR MEDICATED 10.5 CC SOLUTION HI LT ORNG CHLORAPREP

## (undated) DEVICE — GLOVE SURG SZ 65 L12IN FNGR THK87MIL WHT LTX FREE